# Patient Record
Sex: FEMALE | Race: WHITE | NOT HISPANIC OR LATINO | Employment: UNEMPLOYED | URBAN - METROPOLITAN AREA
[De-identification: names, ages, dates, MRNs, and addresses within clinical notes are randomized per-mention and may not be internally consistent; named-entity substitution may affect disease eponyms.]

---

## 2018-11-13 ENCOUNTER — OFFICE VISIT (OUTPATIENT)
Dept: URGENT CARE | Facility: CLINIC | Age: 33
End: 2018-11-13
Payer: COMMERCIAL

## 2018-11-13 VITALS
TEMPERATURE: 98.6 F | HEIGHT: 65 IN | SYSTOLIC BLOOD PRESSURE: 128 MMHG | HEART RATE: 110 BPM | RESPIRATION RATE: 16 BRPM | OXYGEN SATURATION: 99 % | DIASTOLIC BLOOD PRESSURE: 80 MMHG | WEIGHT: 204 LBS | BODY MASS INDEX: 33.99 KG/M2

## 2018-11-13 DIAGNOSIS — L50.8 URTICARIA, ACUTE: Primary | ICD-10-CM

## 2018-11-13 PROCEDURE — G0382 LEV 3 HOSP TYPE B ED VISIT: HCPCS | Performed by: FAMILY MEDICINE

## 2018-11-13 PROCEDURE — 99283 EMERGENCY DEPT VISIT LOW MDM: CPT | Performed by: FAMILY MEDICINE

## 2018-11-13 RX ORDER — METHYLPREDNISOLONE 4 MG/1
TABLET ORAL
Qty: 21 TABLET | Refills: 0 | Status: SHIPPED | OUTPATIENT
Start: 2018-11-13

## 2018-11-13 NOTE — PATIENT INSTRUCTIONS
This allergic reaction appears to be systemic  It is unclear what you may be reacting to  Make a list of everything you ingested yesterday, and, if this recurs in the future, make another list and see if there is anything in common to both lists  If you are unable to determine what is causing the reaction you may have follow up with an allergist for further testing

## 2018-11-13 NOTE — PROGRESS NOTES
Assessment/Plan:      Diagnoses and all orders for this visit:    Urticaria, acute  -     Methylprednisolone 4 MG TBPK; Use as directed on package          Subjective:     Patient ID: April Jose Yeung is a 28 y o  female  Patient is generally not to prone to allergies  This morning, after putting on a pair of socks, she started with increased itching all over her body  She had not ingested anything at that point  She presents now with urticaria which is pretty generalized  She did take some Benadryl which has helped with the itch  Review of Systems   Constitutional: Negative  HENT: Negative  Eyes: Negative  Respiratory: Negative  Skin: Positive for rash  See HPI         Objective:     Physical Exam   Constitutional: She is oriented to person, place, and time  She appears well-developed and well-nourished  HENT:   Head: Normocephalic and atraumatic  Eyes: Conjunctivae are normal    Cardiovascular: Normal rate and regular rhythm  Pulmonary/Chest: Effort normal and breath sounds normal    Neurological: She is alert and oriented to person, place, and time  Skin:   Per is patient has fairly extensive a urticaria involving most of her skin  Psychiatric: She has a normal mood and affect

## 2019-04-24 ENCOUNTER — TRANSCRIBE ORDERS (OUTPATIENT)
Dept: ADMINISTRATIVE | Facility: HOSPITAL | Age: 34
End: 2019-04-24

## 2019-04-24 DIAGNOSIS — E03.9 PRIMARY HYPOTHYROIDISM: Primary | ICD-10-CM

## 2019-05-02 ENCOUNTER — HOSPITAL ENCOUNTER (OUTPATIENT)
Dept: ULTRASOUND IMAGING | Facility: CLINIC | Age: 34
Discharge: HOME | End: 2019-05-02
Payer: COMMERCIAL

## 2019-05-02 DIAGNOSIS — E03.9 PRIMARY HYPOTHYROIDISM: ICD-10-CM

## 2019-05-02 PROCEDURE — 76536 US EXAM OF HEAD AND NECK: CPT

## 2019-09-04 ENCOUNTER — OFFICE VISIT (OUTPATIENT)
Dept: URGENT CARE | Facility: CLINIC | Age: 34
End: 2019-09-04
Payer: COMMERCIAL

## 2019-09-04 VITALS
DIASTOLIC BLOOD PRESSURE: 62 MMHG | OXYGEN SATURATION: 97 % | HEIGHT: 65 IN | HEART RATE: 102 BPM | SYSTOLIC BLOOD PRESSURE: 114 MMHG | RESPIRATION RATE: 16 BRPM | WEIGHT: 188 LBS | TEMPERATURE: 98.8 F | BODY MASS INDEX: 31.32 KG/M2

## 2019-09-04 DIAGNOSIS — J45.909 ASTHMATIC BRONCHITIS WITHOUT COMPLICATION, UNSPECIFIED ASTHMA SEVERITY, UNSPECIFIED WHETHER PERSISTENT: Primary | ICD-10-CM

## 2019-09-04 PROCEDURE — 99283 EMERGENCY DEPT VISIT LOW MDM: CPT | Performed by: EMERGENCY MEDICINE

## 2019-09-04 PROCEDURE — G0382 LEV 3 HOSP TYPE B ED VISIT: HCPCS | Performed by: EMERGENCY MEDICINE

## 2019-09-04 RX ORDER — LEVOTHYROXINE SODIUM 0.05 MG/1
50 TABLET ORAL DAILY
Refills: 3 | COMMUNITY
Start: 2019-08-24

## 2019-09-04 RX ORDER — PREDNISONE 20 MG/1
40 TABLET ORAL DAILY
Qty: 10 TABLET | Refills: 0 | Status: SHIPPED | OUTPATIENT
Start: 2019-09-04 | End: 2019-09-09

## 2019-09-04 RX ORDER — BENZONATATE 100 MG/1
100 CAPSULE ORAL 3 TIMES DAILY PRN
Qty: 20 CAPSULE | Refills: 0 | Status: SHIPPED | OUTPATIENT
Start: 2019-09-04

## 2019-09-04 RX ORDER — AZITHROMYCIN 250 MG/1
TABLET, FILM COATED ORAL
Qty: 6 TABLET | Refills: 0 | Status: SHIPPED | OUTPATIENT
Start: 2019-09-04 | End: 2019-09-08

## 2019-09-04 NOTE — PROGRESS NOTES
Assessment/Plan:    No problem-specific Assessment & Plan notes found for this encounter  Diagnoses and all orders for this visit:    Asthmatic bronchitis without complication, unspecified asthma severity, unspecified whether persistent  -     predniSONE 20 mg tablet; Take 2 tablets (40 mg total) by mouth daily for 5 days  -     benzonatate (TESSALON PERLES) 100 mg capsule; Take 1 capsule (100 mg total) by mouth 3 (three) times a day as needed for cough  -     azithromycin (ZITHROMAX) 250 mg tablet; Take 2 tablets today then 1 tablet daily x 4 days    Other orders  -     levothyroxine 50 mcg tablet; Take 50 mcg by mouth daily          Subjective:      Patient ID: Marilia Boone is a 27 y o  female  Developed laryngitis 2 5 weeks ago, which has resolved but developed a cough with wheezing since    Cough   This is a new problem  The current episode started 1 to 4 weeks ago  The problem has been gradually worsening  The problem occurs every few minutes  The cough is non-productive  Associated symptoms include wheezing  Nothing aggravates the symptoms  She has tried nothing for the symptoms  The treatment provided no relief  The following portions of the patient's history were reviewed and updated as appropriate: allergies, current medications, past family history, past medical history, past social history, past surgical history and problem list     Review of Systems   Respiratory: Positive for cough and wheezing  All other systems reviewed and are negative  Objective:      /62   Pulse 102   Temp 98 8 °F (37 1 °C)   Resp 16   Ht 5' 5" (1 651 m)   Wt 85 3 kg (188 lb)   SpO2 97%   BMI 31 28 kg/m²          Physical Exam   Constitutional: She is oriented to person, place, and time  She appears well-developed and well-nourished  HENT:   Nose: Nose normal    Eyes: Pupils are equal, round, and reactive to light  Neck: Normal range of motion  Cardiovascular: Normal rate  Pulmonary/Chest: Effort normal  She has wheezes  Abdominal: Soft  Neurological: She is alert and oriented to person, place, and time  Skin: Skin is warm and dry  Psychiatric: She has a normal mood and affect  Her behavior is normal  Judgment and thought content normal    Nursing note and vitals reviewed

## 2022-09-28 ENCOUNTER — OFFICE VISIT (OUTPATIENT)
Dept: OBGYN CLINIC | Facility: MEDICAL CENTER | Age: 37
End: 2022-09-28
Payer: MEDICARE

## 2022-09-28 VITALS
SYSTOLIC BLOOD PRESSURE: 146 MMHG | HEIGHT: 65 IN | WEIGHT: 226 LBS | BODY MASS INDEX: 37.65 KG/M2 | HEART RATE: 96 BPM | DIASTOLIC BLOOD PRESSURE: 96 MMHG

## 2022-09-28 DIAGNOSIS — M54.41 ACUTE RIGHT-SIDED LOW BACK PAIN WITH RIGHT-SIDED SCIATICA: Primary | ICD-10-CM

## 2022-09-28 PROCEDURE — 99204 OFFICE O/P NEW MOD 45 MIN: CPT | Performed by: STUDENT IN AN ORGANIZED HEALTH CARE EDUCATION/TRAINING PROGRAM

## 2022-09-28 RX ORDER — MELOXICAM 15 MG/1
15 TABLET ORAL DAILY
Qty: 30 TABLET | Refills: 1 | Status: SHIPPED | OUTPATIENT
Start: 2022-09-28

## 2022-09-28 RX ORDER — GABAPENTIN 300 MG/1
300 CAPSULE ORAL 3 TIMES DAILY
Qty: 90 CAPSULE | Refills: 1 | Status: SHIPPED | OUTPATIENT
Start: 2022-09-28

## 2022-09-28 NOTE — PROGRESS NOTES
1  Acute right-sided low back pain with right-sided sciatica  meloxicam (Mobic) 15 mg tablet    gabapentin (Neurontin) 300 mg capsule    Nerve Stimulator (TENS Therapy Pain Relief) [address]    Ambulatory Referral to [facility]   2  BMI 37 0-37 9, adult  meloxicam (Mobic) 15 mg tablet    gabapentin (Neurontin) 300 mg capsule    Nerve Stimulator (TENS Therapy Pain Relief) [address]    Ambulatory Referral to [facility]     Orders Placed This Encounter   Procedures    Ambulatory Referral to [facility]        Imaging Studies (I personally reviewed images in PACS and report):  Deferred imaging today    IMPRESSION:   Acute right low back pain with reported radiculopathy of right lower extremity - +SLR on right   Atraumatic for the past 2 weeks    Other factors:   BMI 37    PLAN:     Clinical exam and radiographic imaging reviewed with patient today, with impression as per above  I have discussed with the patient the pathophysiology of this diagnosis and reviewed how the examination correlates with this diagnosis   Recommended treating conservatively at this time  I deferred imaging of her low back given she does not have any red flags for immediate imaging at this time  Patient agreeable as well   Regards to pain control, I prescribed her meloxicam 15 mg p o  Q day  I counseled not to concurrently take NSAIDs with meloxicam but could take acetaminophen 500-1000 mg p o  Q 6-8 hours p r n   I have also prescribed her gabapentin 300 mg p o  Q h s  to start with but counseled she can taper up to b i d  And eventually t i d  As needed for pain every 3-4 days  I discussed the side effect of sedation while on gabapentin and that she should not operate heavy machinery if this were to occur  I also recommended p r n  Use of heat/ice therapy 20 minutes on/off   I have also prescribed her a TENs machine unit that she can use 3 times a day for 15 minutes sessions at a time     BMI Counseling: Body mass index is 37 61 kg/m²  The BMI is above normal  Patient referred to PCP due to patient being obese  Return in about 3 weeks (around 10/19/2022)  Portions of the record may have been created with voice recognition software  Occasional wrong word or "sound a like" substitutions may have occurred due to the inherent limitations of voice recognition software  Read the chart carefully and recognize, using context, where substitutions have occurred  CHIEF COMPLAINT:  Chief Complaint   Patient presents with    Spine - Pain         HPI:  Marilia Yeung is a 44 y o  female  who presents for       Visit 9/28/2022 :  Initial evaluation of low back pain:  Patient did see urgent care on 09/20/2022 (note reviewed) - Medrol dose pack prescribed  She reports this been ongoing issue for the past 2 weeks without a precipitating injury and states that she woke up with right lower back pain radiating down her right leg into her foot  She states there is concurrent numbness and tingling  She reports that while on the Medrol Dosepak, she did state this severe pain became more moderate intensity  However now that she recently completed it, she feels the pain slowly returning again  Most of her pain is in the midline and right paralumbar aspect of her back and radiates down to her right foot  She also reports tingling/numbness of her right lower extremity intermittently  She describes the pain as shooting/throbbing/burning  She states is aggravated with any range of motion movements of her back as well as lifting/pushing/pulling  She states an overall tightness over low back in general   She denies any bowel/bladder incontinence  She states that she has had similar low back pain in the past but nothing this severe  She denies prior injuries or surgeries of her low back in the past   She states this pain is interfering with her activities of daily living as well as her ability to sleep at night  She reports attempting to do home stretches but feels too much difficulty on her own to tolerate  Medical, Surgical, Family, and Social History    Past Medical History:   Diagnosis Date    Hypothyroid      History reviewed  No pertinent surgical history  Social History   Social History     Substance and Sexual Activity   Alcohol Use No     Social History     Substance and Sexual Activity   Drug Use No     Social History     Tobacco Use   Smoking Status Never Smoker   Smokeless Tobacco Never Used     History reviewed  No pertinent family history  No Known Allergies       Physical Exam  /96   Pulse 96   Ht 5' 5" (1 651 m)   Wt 103 kg (226 lb)   BMI 37 61 kg/m²     Constitutional:  see vital signs  Gen: obese, normocephalic/atraumatic, well-groomed  Eyes: No inflammation or discharge of conjunctiva or lids; sclera clear   Pharynx: no inflammation, lesion, or mass of lips  Neck: supple, no masses, non-distended  MSK: no inflammation, lesion, mass, or clubbing of nails and digits except for other than mentioned below  SKIN: no visible rashes or skin lesions  Pulmonary/Chest: Effort normal  No respiratory distress         Ortho Exam  BACK EXAM:  Gait: normal, no trendelenberg gait, no antalgic gait    BACK TENDERNESS:  Spinous Processes: +L5  Paraspinal Muscles: +b/l paralumbar  SI Joint: no  Sacrum: no    ROM: (all motions aggravates lumbar)  Flexion: 60  Extension: 5  Lateral flexion: 20 b/l  Rotation: intact: 30 b/l    DERMATOMAL SENSATION:  L1: normal   L2: normal   L3: normal   L4: decreased on right compared to left  L5: decreased on right compared to left  S1: normal    STRENGTH (bilateral):  Knee Extension: 5/5  Knee Flexion: 4/5 on right, 5/5 on left  Foot Dorsiflexion: 5/5  Great Toe Extension: 4/5 on right, 5/5 on left  Foot Plantarflexion: 4/5 on right, 5/5 on left  Hip Flexion: 4/5 on right, 5/5 on left      REFLEXES:  Patellar: 2+ bilateral  Achilles: 2+ bilateral  Clonus: negative bilateral    BACK:   SUPINE STRAIGHT LEG: +on right  SLUMP (seated straight leg trunk flexed): +on right    HIP:  LOG ROLL: negative  CELESTE: negative  FADIR: negative    Procedures

## 2022-10-05 ENCOUNTER — EVALUATION (OUTPATIENT)
Dept: PHYSICAL THERAPY | Facility: CLINIC | Age: 37
End: 2022-10-05
Payer: MEDICARE

## 2022-10-05 DIAGNOSIS — M54.41 ACUTE RIGHT-SIDED LOW BACK PAIN WITH RIGHT-SIDED SCIATICA: Primary | ICD-10-CM

## 2022-10-05 PROCEDURE — 97112 NEUROMUSCULAR REEDUCATION: CPT | Performed by: PHYSICAL THERAPIST

## 2022-10-05 PROCEDURE — 97162 PT EVAL MOD COMPLEX 30 MIN: CPT | Performed by: PHYSICAL THERAPIST

## 2022-10-05 NOTE — PROGRESS NOTES
PT Evaluation     Today's date: 10/5/2022  Patient name: Rockne Bosworth  : 1985  MRN: [id number]  Referring provider: Debbie Johnson MD  Dx:   Encounter Diagnosis     ICD-10-CM    1  Acute right-sided low back pain with right-sided sciatica  M54 41 Ambulatory Referral to [facility]                  Assessment  Assessment details: April Carlos Bullock is a 44 y o  female who presents with pain, decreased strength, decreased ROM, decreased joint mobility, decreased sensation, ambulatory dysfunction and postural dysfunction  Due to these impairments, patient has difficulty performing a/iadls, recreational activities, work-related activities and engaging in social activities  Patient's clinical presentation is consistent with their referring diagnosis of Acute right-sided low back pain with right-sided sciatica , and presents with symptoms that improved with extension based exercises  Will progress on this path initially  Patient has been educated in home exercise program and plan of care  Patient would benefit from skilled physical therapy services to address their aforementioned functional limitations and progress towards prior level of function and independence with home exercise program    Impairments: abnormal gait, abnormal muscle firing, abnormal or restricted ROM, abnormal movement, activity intolerance, impaired physical strength, lacks appropriate home exercise program, pain with function, weight-bearing intolerance, poor posture  and poor body mechanics    Symptom irritability: highUnderstanding of Dx/Px/POC: good   Prognosis: good    Goals  Short Term Goals: Target Date 4 weeks  1  Pt will initiate and advance HEP  2  Pt will have < 3/10 pain  3  Pt will have full arom of the l/s  4  Pt will be able to sit to stand with out difficulty    Long Term Goals: Target Date 8 weeks  1  Pt will demonstrate independence in HEP  2  Pt will have <1/10 pain  3   Pt will have full core and B LE strength  4  Pt will be able to stand for 30 min with out difficulty      Plan  Patient would benefit from: skilled PT  Planned modality interventions: cryotherapy, thermotherapy: hydrocollator packs and TENS  Planned therapy interventions: joint mobilization, manual therapy, patient education, postural training, activity modification, abdominal trunk stabilization, body mechanics training, flexibility, functional ROM exercises, graded exercise, home exercise program, neuromuscular re-education, strengthening, stretching, therapeutic activities, therapeutic exercise, motor coordination training, muscle pump exercises, balance/weight bearing training, ADL training and breathing training  Frequency: 2x week  Duration in weeks: 8  Plan of Care beginning date: 10/5/2022  Plan of Care expiration date: 11/30/2022  Treatment plan discussed with: patient        Subjective Evaluation    History of Present Illness  Mechanism of injury: Patient did see urgent care on 09/20/2022, and orthopedics (notes reviewed) - Medrol dose pack prescribed by care now which helped for first day only  She reports this been ongoing issue for the past 3 weeks without any injury and states that she woke up with right lower back pain radiating down her right leg into her foot  She states there is concurrent numbness and tingling in the right foot  She also notes that since the onset she also saw chiro who has helped and allowed her to stand upright again  Most of her pain is in the midline and right paralumbar aspect of her back and radiates down to her right foot  She also reports tingling/numbness of her right foot fairly regularly  She describes the pain as shooting/throbbing/burning  She states is aggravated with any WB activity, and she also is unable to carry anything  She states an overall tightness over low back in general   She denies any bowel/bladder incontinence    She states that she has had similar low back pain in the past but nothing this severe, and not radiating down the leg  She denies prior injuries or surgeries of her low back in the past   She states this pain is interfering with her activities of daily living as well as her ability to sleep at night  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  She reports attempting to do home stretches but feels like they aggravated things  She has been sleeping on her recliner with inability to sleep in bed  Has trouble standing more than 10 days  She also notes that the gabapentin has been very helpful with her pain levels as well  Pain  Current pain rating: 3  At best pain rating: 3  At worst pain rating: 10  Location: l/s and right leg  Quality: dull ache, discomfort, radiating, throbbing and sharp  Aggravating factors: sitting  Progression: improved    Patient Goals  Patient goals for therapy: decreased pain, increased motion, independence with ADLs/IADLs, increased strength, return to sport/leisure activities, return to work and improved balance          Objective     Concurrent Complaints  Positive for disturbed sleep  Negative for night pain, bladder dysfunction, bowel dysfunction, saddle (S4) numbness, history of cancer, history of trauma and infection    Static Posture   General Observations  Asymmetrical weight bearing, shifted left, flexed and guarded  Thoracic Spine  Hyperkyphosis  Lumbar Spine   Increased lordosis  Palpation   Left   Tenderness of the quadratus lumborum  Right   Tenderness of the gluteus ernst, gluteus medius, piriformis and quadratus lumborum  Tenderness     Right Hip   Tenderness in the PSIS       Neurological Testing     Sensation     Hip   Left Hip   Intact: light touch    Right Hip   Diminished: light touch    Reflexes   Left   Patellar (L4): normal (2+)  Achilles (S1): normal (2+)    Right   Patellar (L4): normal (2+)  Achilles (S1): normal (2+)    Active Range of Motion     Lumbar   Flexion:  with pain Restriction level: moderate  Extension:  Restriction level: minimal  Left lateral flexion:  Restriction level: moderate  Right lateral flexion:  with pain Restriction level: moderate  Mechanical Assessment    Cervical      Thoracic      Lumbar    Lying extension: repeated movements  Pain location: centralized  Pain intensity: better  Pain level: decreased    Strength/Myotome Testing     Left Hip   Planes of Motion   Flexion: 4    Right Hip   Planes of Motion   Flexion: 3+    Left Knee   Flexion: 4  Extension: 4    Right Knee   Flexion: 3+  Extension: 3-    Left Ankle/Foot   Dorsiflexion: 4+  Great toe extension: 4    Right Ankle/Foot   Dorsiflexion: 3+ (improved with PPU)  Great toe extension: 3+ (improved to 4- with ppu)    Muscle Activation   Patient able to activate left transverse abdominals, left external obliques, left internal obliques, left multifidus, right transverse abdominals, right external obliques, right internal obliques and right multifidus  Tests     Lumbar   Positive prone instability   Right   Positive slump test      Ambulation     Observational Gait   Gait: asymmetric   Increased left stance time and right swing time  Decreased walking speed, stride length, right stance time, left swing time, left step length and right step length     Left foot contact pattern: heel to toe  Right foot contact pattern: foot flat             Precautions: hashimoto's disease      Manuals 10/5                                                                Neuro Re-Ed                                                                                                        Ther Ex                                                                                                                     Ther Activity                                       Gait Training                                       Modalities             HEP DB

## 2022-10-10 ENCOUNTER — OFFICE VISIT (OUTPATIENT)
Dept: PHYSICAL THERAPY | Facility: CLINIC | Age: 37
End: 2022-10-10
Payer: MEDICARE

## 2022-10-10 DIAGNOSIS — M54.41 ACUTE RIGHT-SIDED LOW BACK PAIN WITH RIGHT-SIDED SCIATICA: Primary | ICD-10-CM

## 2022-10-10 PROCEDURE — 97140 MANUAL THERAPY 1/> REGIONS: CPT | Performed by: PHYSICAL THERAPIST

## 2022-10-10 PROCEDURE — 97112 NEUROMUSCULAR REEDUCATION: CPT | Performed by: PHYSICAL THERAPIST

## 2022-10-10 NOTE — PROGRESS NOTES
Daily Note     Today's date: 10/10/2022  Patient name: Jackie Flores  : 1985  MRN: [id number]  Referring provider: Anh Dias MD  Dx:   Encounter Diagnosis     ICD-10-CM    1  Acute right-sided low back pain with right-sided sciatica  M54 41                   Subjective:  Pt notes that she has been doing her exercises consistently  She also notes that she feels as if her walking is better, and less fuzziness in her toes  Objective: See treatment diary below      Assessment: pt did have pain today in prone with left knee flexion provoking right post thigh pain  This was reduced with l/s pa mobs l/s stm and PPU  Progress is being made with exercises  Did increase the core stabilization program today for clinic and HEP  Spoke to T[employer] rep today, will have a donated unit for her next visit  Plan: Continue per plan of care        Precautions: hashimoto's disease      Manuals 10/5 10/10           Right gluteal stm  Db           L/s pa and rot mobs in prone and s/l resp  Grade 2-3 Db           L/s psm stm  DB                        Neuro Re-Ed             ppt  5''x2x10           Ppt bkfo  5''x2x10           Ppt march  5''x2x10           Ppt bridge  2x5                                                  Ther Ex             Prone knee flex stretch  DB                                                                                                      Ther Activity                                       Gait Training                                       Modalities             HEP DB

## 2022-10-13 ENCOUNTER — APPOINTMENT (OUTPATIENT)
Dept: PHYSICAL THERAPY | Facility: CLINIC | Age: 37
End: 2022-10-13

## 2022-10-18 ENCOUNTER — OFFICE VISIT (OUTPATIENT)
Dept: PHYSICAL THERAPY | Facility: CLINIC | Age: 37
End: 2022-10-18
Payer: MEDICARE

## 2022-10-18 DIAGNOSIS — M54.41 ACUTE RIGHT-SIDED LOW BACK PAIN WITH RIGHT-SIDED SCIATICA: Primary | ICD-10-CM

## 2022-10-18 PROCEDURE — 97112 NEUROMUSCULAR REEDUCATION: CPT

## 2022-10-18 PROCEDURE — 97140 MANUAL THERAPY 1/> REGIONS: CPT

## 2022-10-18 NOTE — PROGRESS NOTES
Daily Note     Today's date: 10/18/2022  Patient name: Charlene Dao  : 1985  MRN: [id number]  Referring provider: Lily Peralta MD  Dx:   Encounter Diagnosis     ICD-10-CM    1  Acute right-sided low back pain with right-sided sciatica  M54 41        Start Time: 0950  Stop Time: 1033  Total time in clinic (min): 43 minutes    Subjective: Pt reports she has started to see some improvements in symptoms since starting therapy  Notes radicular symptoms have been improving and less intense  Has reduced taking nerve block medication from twice a day to once a day  Objective: See treatment diary below  Educated on use of donated TENS unit and provided for use at home  Pt acknowledged understanding of all that was discussed  Assessment: Tolerated treatment well  Continued with program as outlined below, with focus on core stability and mobility of L/S   R L/S mobility did begin to improve with manual mobilizations performed by therapist   Most challenged with PPT bridges, but was able to progress with increased reps today  Patient would benefit from continued PT to further improve strength, decrease intensity/frequency of symptoms, and maximize overall function  Plan: Continue per plan of care        Precautions: hashimoto's disease      Manuals 10/5 10/10 10/18          Right gluteal stm  Db AFB          L/s pa and rot mobs in prone and s/l   Grade 2-3 Db Grade 2-3 Db          L/s psm stm  DB AFB                       Neuro Re-Ed             ppt  5''x2x10 5''x2x10          Ppt bkfo  5''x2x10 5''x2x10          Ppt march  5''x2x10 5''x2x10          Ppt bridge  2x5 3x5                                                 Ther Ex             Prone knee flex stretch  DB AFB                                                                                                     Ther Activity                                       Gait Training                                       Modalities HEP DB

## 2022-10-21 ENCOUNTER — OFFICE VISIT (OUTPATIENT)
Dept: PHYSICAL THERAPY | Facility: CLINIC | Age: 37
End: 2022-10-21
Payer: MEDICARE

## 2022-10-21 DIAGNOSIS — M54.41 ACUTE RIGHT-SIDED LOW BACK PAIN WITH RIGHT-SIDED SCIATICA: Primary | ICD-10-CM

## 2022-10-21 PROCEDURE — 97112 NEUROMUSCULAR REEDUCATION: CPT

## 2022-10-21 PROCEDURE — 97140 MANUAL THERAPY 1/> REGIONS: CPT

## 2022-10-21 PROCEDURE — 97110 THERAPEUTIC EXERCISES: CPT

## 2022-10-21 NOTE — PROGRESS NOTES
Daily Note     Today's date: 10/21/2022  Patient name: Russ Briceno  : 1985  MRN: [id number]  Referring provider: Lauri Silver MD  Dx:   Encounter Diagnosis     ICD-10-CM    1  Acute right-sided low back pain with right-sided sciatica  M54 41                   Subjective: pt notes that overall she is getting better, her pain in leg is still constant but much less and no more tingling in toes  Objective: See treatment diary below      Assessment: Tolerated treatment with additional exercises with no increased sxs except with trial of QL stretch toward right started with very mild sxs in hip,held this direction for now    Patient exhibited good technique with therapeutic exercises and would benefit from continued PT      Plan: Continue per plan of care        Precautions: hashimoto's disease      Manuals 10/5 10/10 10/18 10/21         Right gluteal stm  Db AF[employer] DL         L/s pa and rot mobs in prone and s/l   Grade 2-3 Db Grade 2-3 Db DB in prone         L/s psm stm  DB AF[employer] DL                      Neuro Re-Ed             ppt  5''x2x10 5''x2x10 5"x20         Ppt bkfo  5''x2x10 5''x2x10 5"x20         Ppt march  5''x2x10 5''x2x10 5"x20         Ppt bridge  2x5 3x5 2x10         Ppt hip add squeeze    5"x10         Ppt w/ hip abd    5"x10                      Ther Ex             Prone knee flex stretch  DB AFB DL         Bike     lv 0 5'         QL stretch     5'x5 for R side                                                                          Ther Activity                                       Gait Training                                       Modalities             HEP DB

## 2022-10-25 ENCOUNTER — OFFICE VISIT (OUTPATIENT)
Dept: PHYSICAL THERAPY | Facility: CLINIC | Age: 37
End: 2022-10-25
Payer: MEDICARE

## 2022-10-25 DIAGNOSIS — M54.41 ACUTE RIGHT-SIDED LOW BACK PAIN WITH RIGHT-SIDED SCIATICA: Primary | ICD-10-CM

## 2022-10-25 PROCEDURE — 97140 MANUAL THERAPY 1/> REGIONS: CPT | Performed by: PHYSICAL THERAPIST

## 2022-10-25 PROCEDURE — 97112 NEUROMUSCULAR REEDUCATION: CPT | Performed by: PHYSICAL THERAPIST

## 2022-10-25 PROCEDURE — 97110 THERAPEUTIC EXERCISES: CPT | Performed by: PHYSICAL THERAPIST

## 2022-10-25 NOTE — PROGRESS NOTES
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, as well as decreased gabapentin in the am        Objective: See treatment diary below      Assessment: Pt with improved tolerance to movement, but still with weakness in the right LE  Pt did well with progression of exercises today will progress to pallof and chops nv    Plan: Continue per plan of care        Precautions: hashimoto's disease      Manuals 10/5 10/10 10/18 10/21 10/25        Right gluteal stm  Db AFB DL DB        L/s pa and rot mobs in prone and s/l   Grade 2-3 Db Grade 2-3 Db DB in prone In s/l 3-4        L/s psm stm  DB AFB DL DB                     Neuro Re-Ed             ppt  5''x2x10 5''x2x10 5"x20 HEP        Ppt bkfo  5''x2x10 5''x2x10 5"x20 2# 2x10        Ppt march  5''x2x10 5''x2x10 5"x20 SlR x10 ea        Ppt bridge  2x5 3x5 2x10 2x10        Ppt hip add squeeze    5"x10 5''x20        Ppt w/ hip abd    5"x10 gtb 5'x2'x10        Prone hip ext alt w/ ppt     2x10        Ther Ex             Prone knee flex stretch  DB AFB DL nv        Bike     lv 0 5' lv 1 5'        QL stretch     5'x5 for R side 5''x10 R side        [place] and arrow     5''x10                                                            Ther Activity                                       Gait Training                                       Modalities             HEP DB

## 2022-11-01 ENCOUNTER — TELEPHONE (OUTPATIENT)
Dept: OBGYN CLINIC | Facility: CLINIC | Age: 37
End: 2022-11-01

## 2022-11-01 NOTE — TELEPHONE ENCOUNTER
Called & LVM for patient asking her to call back [phone]  I spoke to Dr Franco Hathaway & has specific instructions depending on how many times a day patient is currently taking Gabapentin  I will await call back to instruct on weaning dose per Ricardo

## 2022-11-01 NOTE — TELEPHONE ENCOUNTER
Caller: April    Doctor: Jordan Mcgill    Reason for call: returning a call    Unable to transfer     Call back#: [phone]

## 2022-11-01 NOTE — TELEPHONE ENCOUNTER
Caller: Patient    Doctor: Shorty Leon     Reason for call: Patient states she has been doing PT and is feeling very well  She is wanting to know if she should be taken off the gabapentin 300 mg and if so how can she be weaned off?      Call back#: [phone]

## 2022-11-01 NOTE — TELEPHONE ENCOUNTER
Called & spoke to patient  Currently taking once every night  I let her know, per Dr Millan [place], she can continue to take this regimen for 3 more nights and then cease  Patient verbalized understanding

## 2022-11-17 ENCOUNTER — OFFICE VISIT (OUTPATIENT)
Dept: ENDOCRINOLOGY | Facility: HOSPITAL | Age: 37
End: 2022-11-17

## 2022-11-17 VITALS
BODY MASS INDEX: 38.75 KG/M2 | HEIGHT: 65 IN | SYSTOLIC BLOOD PRESSURE: 122 MMHG | WEIGHT: 232.6 LBS | DIASTOLIC BLOOD PRESSURE: 78 MMHG | HEART RATE: 96 BPM

## 2022-11-17 DIAGNOSIS — R63.5 WEIGHT GAIN: ICD-10-CM

## 2022-11-17 DIAGNOSIS — E55.9 VITAMIN D DEFICIENCY: ICD-10-CM

## 2022-11-17 DIAGNOSIS — E03.8 HYPOTHYROIDISM DUE TO HASHIMOTO'S THYROIDITIS: Primary | ICD-10-CM

## 2022-11-17 DIAGNOSIS — E06.3 HYPOTHYROIDISM DUE TO HASHIMOTO'S THYROIDITIS: Primary | ICD-10-CM

## 2022-11-17 RX ORDER — LEVOTHYROXINE SODIUM 88 UG/1
88 TABLET ORAL DAILY
COMMUNITY
Start: 2022-09-28

## 2022-11-17 NOTE — PROGRESS NOTES
11/19/2022    Assessment/Plan      Diagnoses and all orders for this visit:    Hypothyroidism due to Hashimoto's thyroiditis  -     Comprehensive metabolic panel Lab Collect  -     Insulin-like growth factor 1 (IGF-1) Lab Collect  -     Prolactin Lab Collect  -     Testosterone, free, total Lab Collect  -     Vitamin D 25 hydroxy Lab Collect  -     Insulin, fasting- Lab Collect    Weight gain  -     Comprehensive metabolic panel Lab Collect  -     Insulin-like growth factor 1 (IGF-1) Lab Collect  -     Prolactin Lab Collect  -     Testosterone, free, total Lab Collect  -     Vitamin D 25 hydroxy Lab Collect  -     Insulin, fasting- Lab Collect  -     Creatinine, urine, 24 hour- Lab Collect; Future  -     Cortisol, Free, Urine, 24 Hour; Future    Vitamin D deficiency  -     Comprehensive metabolic panel Lab Collect  -     Insulin-like growth factor 1 (IGF-1) Lab Collect  -     Prolactin Lab Collect  -     Testosterone, free, total Lab Collect  -     Vitamin D 25 hydroxy Lab Collect  -     Insulin, fasting- Lab Collect    Other orders  -     levothyroxine 88 mcg tablet; Take 88 mcg by mouth daily  -     COD LIVER OIL PO; Take 3,000 mg by mouth in the morning  -     Ascorbic Acid (VITAMIN C PO); Take by mouth  -     Probiotic Product (PROBIOTIC PO); Take by mouth With vitamin c  -     MISC NATURAL PRODUCTS PO; Take by mouth Desiccated thyroid-[facility] health 130 mg supplement (reportedly T3) daily  -     MISC NATURAL PRODUCTS PO; Take by mouth Molecular progesterone complex from [facility] health daily day 14-28 of her cycle  -     IODINE, KELP, PO; Take by mouth Standard process prolamine iodine 3000 mg daily  -     MISC NATURAL PRODUCTS PO; Take by mouth Liver supplement by standard process, Betafood 312 mg daily        Assessment/Plan:  1  Hypothyroidism due to Hashimoto's thyroiditis  She is on levothyroxine and some sort of supplement she got on the Internet that is supposed to be a T3 supplementation    I have educated her that this is not approved by the FDA and there is no clue as to exactly what is in this supplement and that it may not have the same amount in each pill that she takes  She is also taking some sort of iodine supplement and it is unclear exactly how much  I educated her that high doses of iodine in a patient with Hashimoto's can actually worsen there hypothyroidism due to its effects on the enzyme needed to make thyroid hormone and also it is a problem in patients with hyperthyroidism due to Graves disease as it is like adding fuel to the fire  She is going to get me a photo of all her supplements so that I have a better idea of what she is taking  I did discuss with her that I do not utilize supplements to add T3 when I want to add T3 and a patient, there is a prescription brand T3 supplementation called Cytomel/liothyronine and I usually add that so I have a better idea of exactly what patients are taking and can adjust accordingly  2  Vitamin-D deficiency  She reportedly has a history of vitamin-D deficiency and is on no specific vitamin-D replacement  I will check a vitamin-D level  3  Weight gain  I will evaluate for secondary causes of weight gain including insulin resistance, pituitary abnormalities given her history hemorrhage after a pregnancy and lack of breast milk production with her last pregnancy, and Cushing's disease  She will be getting a fasting CMP with insulin level, IGF-1 level, prolactin, free and total testosterone level, and 25 hydroxy vitamin-D level  She will also do a 24 hour urine for urinary free cortisol and creatinine  Follow-up will be determined based on the blood work and exactly what supplements she is taking so that we can make adjustments  60 minutes was spent with the patient and over 50% was spent in education regarding hypothyroidism and complications including the use of iodine and thyroid supplements        CC:  Thyroid consult    History of Present Illness     HPI: Marilia Cantrell is a 44y o  year old female with history of hypothyroidism due to Hashimoto's thyroiditis for evaluation/consult  She reports that she had struggles with weight and symptoms of hypothyroidism since 2010  She was 140 lb at that time and is now 230 lb  She was exercising and running a lot and still was gaining weight and she had seen a primary care physician who brushed off her symptoms and checked a TSH which was always okay but symptoms seem to worsen  She had 5 pregnancies with her last pregnancy, she was unable to produce breast milk at the age of 31 when she was able to nurse all other children  She did have lots of bleeding and hemorrhaging after that delivery but she did not need any blood transfusions and this was 7 years ago  She has not tried to get pregnant since and had no other complications with prior pregnancies  She reports that she was diagnosed with hypothyroidism due to Hashimoto's thyroiditis in 2010 when she saw a new primary care physician  Thyroid hormone was started in the dose was adjusted over the years since she is now on levothyroxine 88 mcg daily which was increased in 2021  She still had low energy and felt all over pain and saw her holistic clinic doctor who recommended starting desiccated T3-3 months ago which she was told to just get on the Internet over-the-counter and started taking that  It is unclear exactly how much T3 she is taking as this is a supplement  She reports she has continued to gain weight but did have to stop running as her ankles have become very painful and she can not exercise  She has poor sleep and nausea with food  She does not have any family history of thyroid disease or autoimmune disease  She has heat intolerance and can feel warm all the time  She denies palpitation or tremors  She never feels rested in the morning and is always fatigued    She has not noticed a change with starting the supplement that is supposedly has desiccated T3  She does though have insomnia but never feels she gets deep quality sleep  She has no diarrhea or constipation, anxiety or depression  She has brittle nails and generalized hair loss but not the male pattern  She has no dry skin  She has gained her weight over the last 12 years slowly about 90 lb despite exercising and eating clean  She has not been able to get the weight off after the 4th and 5th pregnancy weight gains  Menstrual cycles have been irregular her whole life and can occur anywhere from 3-7 weeks  They usually are quite heavy in the 1st several days but only last 3-4 days  She is been utilizing progesterone and now over the last 2 months her cycles have been every 29 days  She denies diplopia  She denies compressive thyroid symptoms or difficulties with swallowing  She is no history of head or neck irradiation in the past   Of note, she does have a family history of type 2 diabetes and has no violaceous striae or hirsutism  Review of Systems   Constitutional: Positive for fatigue and unexpected weight change  Does not feel rested in am and always fatigued  Felt no different with the desiccated thyroid T3  Kiki gain up 90 lbs in 12 years slowly despite exercise and eating clean  Weight gain much more with 4th and 5th pregnancy and difficult to get weight off  Started [employer]  Got some weight off  Eating clean 70% of the time now, extra 35 lbs gained in last 7 years  HENT: Negative for hearing loss, tinnitus and trouble swallowing  No XRT the head or neck in the past    Eyes: Negative for visual disturbance  No diplopia  Respiratory: Positive for shortness of breath  Negative for chest tightness  SOB easier with activity  Cardiovascular: Positive for leg swelling  Negative for chest pain and palpitations  Feet and face can swell especially when exercising     Gastrointestinal: Positive for nausea  Negative for abdominal pain, constipation and diarrhea  Have nausea on awakening and after eating  Endocrine: Positive for heat intolerance  Negative for cold intolerance, polydipsia, polyphagia and polyuria  Can feel warm at times  Nocturia none  Hunger when awakening, has to eat within 1 hour of waking or will have nausea  Genitourinary:        Menses irregular whole life  Can occur every 3-7 weeks  3 days with 2 very heavy and clotting days and then 1 lighter day  Menarche around age 14  Were regular on a monthly basis and then got irregular in her 19's  No infertility  No galactorrhea  Maybe once a year, skip 1 month  Last 2 months, has had a 29 day cycle since using progesterone  Also less heavy  Musculoskeletal: Positive for arthralgias, back pain and myalgias  Joint and muscle aches all over  No alleviating factors  Increased activity will worsen it  Skin: Negative for rash  No dry skin  Has brittle nails  Has hair loss, generalized, not int he male pattern  Hirsutism none  No violaceous striae  Neurological: Positive for weakness  Negative for dizziness, tremors, light-headedness, numbness and headaches  Feels generally weak but no proximal muscle weakness  Feels less strong than in the past     Psychiatric/Behavioral: Positive for sleep disturbance  Negative for dysphoric mood  The patient is not nervous/anxious  Had insomnia off levothyroxine  Now a bit better but feels no deep quality sleep  Historical Information   Past Medical History:   Diagnosis Date   • Gastrointestinal food allergy     mushrooms, severe abdominal pain   • Hypothyroid      History reviewed  No pertinent surgical history    Social History   Social History     Substance and Sexual Activity   Alcohol Use No     Social History     Substance and Sexual Activity   Drug Use No     Social History     Tobacco Use   Smoking Status Never   Smokeless Tobacco Never     Family History:   Family History   Problem Relation Age of Onset   • Hypothyroidism Mother    • Thyroid cancer Mother    • Diabetes type II Father    • Heart disease Father    • Heart attack Father         1st one at age 45   • Hypothyroidism Sister    • Diabetes type II Sister    • Thyroid cancer Sister    • No Known Problems Son    • No Known Problems Son    • No Known Problems Son    • No Known Problems Son    • No Known Problems Daughter        Meds/Allergies   Current Outpatient Medications   Medication Sig Dispense Refill   • Ascorbic Acid (VITAMIN C PO) Take by mouth     • COD LIVER OIL PO Take 3,000 mg by mouth in the morning     • IODINE, KELP, PO Take by mouth Standard process prolamine iodine 3000 mg daily     • levothyroxine 88 mcg tablet Take 88 mcg by mouth daily     • MISC NATURAL PRODUCTS PO Take by mouth Desiccated thyroid-[facility] health 130 mg supplement (reportedly T3) daily     • MISC NATURAL PRODUCTS PO Take by mouth Molecular progesterone complex from [facility] daily day 14-28 of her cycle     • MISC NATURAL PRODUCTS PO Take by mouth Liver supplement by standard process, Betafood 312 mg daily     • Nerve Stimulator (TENS Therapy Pain Relief) BERNARDINO Use 1 Device 3 (three) times a day as needed (Apply up to 15 minutes per session as needed for pain) 1 each 0   • Probiotic Product (PROBIOTIC PO) Take by mouth With vitamin c       No current facility-administered medications for this visit  No Known Allergies    Objective   Vitals: Blood pressure 122/78, pulse 96, height 5' 5" (1 651 m), weight 106 kg (232 lb 9 6 oz)  Invasive Devices     None                 Physical Exam  Vitals reviewed  Constitutional:       Appearance: Normal appearance  She is well-developed and well-nourished  HENT:      Head: Normocephalic and atraumatic        Comments: No moon faces  Eyes:      Extraocular Movements: Extraocular movements intact and EOM normal       Conjunctiva/sclera: Conjunctivae normal       Comments: No lid lag, stare, proptosis, or periorbital edema  Neck:      Thyroid: No thyromegaly  Vascular: No carotid bruit  Comments: Thyroid normal in size  No palpable thyroid nodules  No bruits over the thyroid gland  Has a buffalo hump  Has slight supraclavicular fat pads  Cardiovascular:      Rate and Rhythm: Normal rate and regular rhythm  Pulses: Intact distal pulses  Heart sounds: Normal heart sounds  No murmur heard  Pulmonary:      Effort: Pulmonary effort is normal       Breath sounds: Normal breath sounds  No wheezing  Abdominal:      General: Bowel sounds are normal       Palpations: Abdomen is soft  Tenderness: There is no abdominal tenderness  Musculoskeletal:         General: No deformity or edema  Normal range of motion  Cervical back: Normal range of motion and neck supple  Right lower leg: No edema  Left lower leg: No edema  Comments: No tremor of the outstretched hands  No spinous process tenderness  No CVA tenderness  Lymphadenopathy:      Cervical: No cervical adenopathy  Skin:     General: Skin is warm and dry  Findings: No rash  Comments: No violaceous striae  Neurological:      Mental Status: She is alert and oriented to person, place, and time  Deep Tendon Reflexes: Reflexes are normal and symmetric  Comments: Patellar deep tendon reflexes normal          The history was obtained from the review of the chart and from the patient  Lab Results:     Blood work done on 11/07/2022 showed a CBC that was normal   CMP showed a glucose of 93 fasting but was otherwise normal   Total cholesterol 190, triglyceride 96, HDL 45,   HADLEY was negative  Rheumatoid factor is less than 7   C-reactive protein is 8  Sed rate is 17  Blood work done on 10/28/2022 showed a TSH of 2 16 with a free T4 of 1 01 and a total T3 of 127    Thyroid peroxidase antibodies are 282 and thyroglobulin antibodies are 3  Blood work done on 06/07/2022 showed a TSH of 3 02     Blood work done on 09/13/2021 showed a TSH of 1 95 with a free T4 of 1 08  Blood work done on 07/09/2021 showed a thyroid peroxidase antibodies of 267 with a TSH of 5 67  Blood work done on 03/22/2019 showed a normal CBC  Thyroglobulin antibodies are 2 1  Twenty-five hydroxy vitamin-D level is 16 4  Vitamin B12 is 576  Ferritin level is 27  And thyroid peroxidase antibodies were 440  No future appointments

## 2022-11-17 NOTE — PATIENT INSTRUCTIONS
Get me a photo of the bottle of supplements  We'll do fasting blood work  We can do 24 hours urine test      Follow up to be determined

## 2023-04-06 LAB
25(OH)D3+25(OH)D2 SERPL-MCNC: 18.6 NG/ML (ref 30–100)
ALBUMIN SERPL-MCNC: 4.2 G/DL (ref 3.8–4.8)
ALBUMIN/GLOB SERPL: 1.8 {RATIO} (ref 1.2–2.2)
ALP SERPL-CCNC: 89 IU/L (ref 44–121)
ALT SERPL-CCNC: 24 IU/L (ref 0–32)
AST SERPL-CCNC: 16 IU/L (ref 0–40)
BILIRUB SERPL-MCNC: <0.2 MG/DL (ref 0–1.2)
BUN SERPL-MCNC: 14 MG/DL (ref 6–20)
BUN/CREAT SERPL: 20 (ref 9–23)
CALCIUM SERPL-MCNC: 9.2 MG/DL (ref 8.7–10.2)
CHLORIDE SERPL-SCNC: 101 MMOL/L (ref 96–106)
CO2 SERPL-SCNC: 26 MMOL/L (ref 20–29)
CREAT SERPL-MCNC: 0.7 MG/DL (ref 0.57–1)
EGFR: 113 ML/MIN/1.73
GLOBULIN SER-MCNC: 2.4 G/DL (ref 1.5–4.5)
GLUCOSE SERPL-MCNC: 89 MG/DL (ref 70–99)
IGF-I SERPL-MCNC: 156 NG/ML (ref 79–259)
INSULIN SERPL-ACNC: 25 UIU/ML (ref 2.6–24.9)
POTASSIUM SERPL-SCNC: 4.5 MMOL/L (ref 3.5–5.2)
PROLACTIN SERPL-MCNC: 8.7 NG/ML (ref 4.8–23.3)
PROT SERPL-MCNC: 6.6 G/DL (ref 6–8.5)
SODIUM SERPL-SCNC: 138 MMOL/L (ref 134–144)
TESTOST FREE SERPL-MCNC: 1.4 PG/ML (ref 0–4.2)
TESTOST SERPL-MCNC: 17 NG/DL (ref 8–60)

## 2024-05-30 ENCOUNTER — HOSPITAL ENCOUNTER (OUTPATIENT)
Dept: ULTRASOUND IMAGING | Facility: CLINIC | Age: 39
Discharge: HOME | End: 2024-05-30
Payer: MEDICARE

## 2024-05-30 DIAGNOSIS — E06.9 THYROIDITIS, UNSPECIFIED: ICD-10-CM

## 2024-05-30 DIAGNOSIS — E04.1 NONTOXIC UNINODULAR GOITER: ICD-10-CM

## 2024-05-30 PROCEDURE — 76536 US EXAM OF HEAD AND NECK: CPT

## 2024-11-25 ENCOUNTER — NURSE TRIAGE (OUTPATIENT)
Age: 39
End: 2024-11-25

## 2024-11-25 NOTE — TELEPHONE ENCOUNTER
"Reason for Conversation: Pt calling to schedule a new pt appt with Cardiology through self referral for b/l leg swelling and sob with exertion that has been happening for months but is recently getting worse. She states the swelling started in her ankles but recently progressed up into her knees. She stated the swelling comes and goes and is worse at night. Also complaining of sob with exertion. States it does not interfere with her normal activities but she does take it easy when she feels it. Denies chest pain napoleon wheezing. Pt unsure of BP.    New pt appt made for pt for tomorrow, 11/26. Advised pt to report to the ED with any worsening s/s. She voiced understanding.     VS/Weight: (Note: Please include date/time vitals/weight were measured)  pt unsure    Pain: No    Risk Factors: Other unknown, denies any cardiac hx. Does have hashimoto's     Recent relevant testing and date of testing: Pt was in ED in July and had LEV done which was negative for DVT    Upcoming Office Visit: Yes, scheduled for tomorrow, 11/26    Last Office Visit: N/A      Reason for Disposition   MODERATE swelling of both ankles (e.g., swelling extends up to the knees) AND new-onset or getting worse    Answer Assessment - Initial Assessment Questions  1. ONSET: \"When did the swelling start?\" (e.g., minutes, hours, days)      B/l feet swelling for months but has recently gotten worse and now has swelling spreading from ankles to knees   2. LOCATION: \"What part of the leg is swollen?\"  \"Are both legs swollen or just one leg?\"      B/l feet now spreading up to knees. States the swelling comes and goes and sometimes can be consistent. States was recently traveling on a cruise. States better in the am, worse at night.   3. SEVERITY: \"How bad is the swelling?\" (e.g., localized; mild, moderate, severe)      Denies pitting edema. Denies swelling elsewhere   4. REDNESS: \"Does the swelling look red or infected?\"      denies  5. PAIN: \"Is the swelling " "painful to touch?\" If Yes, ask: \"How painful is it?\"   (Scale 1-10; mild, moderate or severe)      States the legs get painful at night because she feels her skin was stretching from the swelling   6. FEVER: \"Do you have a fever?\" If Yes, ask: \"What is it, how was it measured, and when did it start?\"       denies  7. CAUSE: \"What do you think is causing the leg swelling?\"      Unsure   8. MEDICAL HISTORY: \"Do you have a history of blood clots (e.g., DVT), cancer, heart failure, kidney disease, or liver failure?\"      Hashimoto's   9. RECURRENT SYMPTOM: \"Have you had leg swelling before?\" If Yes, ask: \"When was the last time?\" \"What happened that time?\"      Denies consistent leg swelling but states in the past has had ankle swelling from over exerting herself   10. OTHER SYMPTOMS: \"Do you have any other symptoms?\" (e.g., chest pain, difficulty breathing)        Sob with exertion. R hand fingertips feel a little numb and aching starting this week. Denies swelling in the hands. Denies chest pain, but states sometimes has a electric shock type feeling in her left shoulder once every 6 months. States does get a little dizzy in her head where she feels a little \"spacey\". Denies vertigo   11. PREGNANCY: \"Is there any chance you are pregnant?\" \"When was your last menstrual period?\"        denies    Answer Assessment - Initial Assessment Questions  1. RESPIRATORY STATUS: \"Describe your breathing?\" (e.g., wheezing, shortness of breath, unable to speak, severe coughing)       Sob with exertion. Denies wheezing   2. ONSET: \"When did this breathing problem begin?\"       Couple months ago and increasing   3. PATTERN \"Does the difficult breathing come and go, or has it been constant since it started?\"       Every time she exerts herself   4. SEVERITY: \"How bad is your breathing?\" (e.g., mild, moderate, severe)       States she can still perform her normal activities with the sob but takes it easy   5. RECURRENT SYMPTOM: \"Have you " "had difficulty breathing before?\" If Yes, ask: \"When was the last time?\" and \"What happened that time?\"       Pt has autoimmune disease and feels its hard to tell apart her s/s.  6. CARDIAC HISTORY: \"Do you have any history of heart disease?\" (e.g., heart attack, angina, bypass surgery, angioplasty)       Denies   7. LUNG HISTORY: \"Do you have any history of lung disease?\"  (e.g., pulmonary embolus, asthma, emphysema)      denies  8. CAUSE: \"What do you think is causing the breathing problem?\"       Unsure   9. OTHER SYMPTOMS: \"Do you have any other symptoms?\" (e.g., chest pain, cough, dizziness, fever, runny nose)      B/l leg swelling  10. O2 SATURATION MONITOR:  \"Do you use an oxygen saturation monitor (pulse oximeter) at home?\" If Yes, ask: \"What is your reading (oxygen level) today?\" \"What is your usual oxygen saturation reading?\" (e.g., 95%)        Unsure   12. TRAVEL: \"Have you traveled out of the country in the last month?\" (e.g., travel history, exposures)        Recent cruise past week.    Protocols used: Leg Swelling and Edema-Adult-[place], Breathing Difficulty-Adult-[place]    "

## 2024-11-26 ENCOUNTER — OFFICE VISIT (OUTPATIENT)
Dept: CARDIOLOGY CLINIC | Facility: CLINIC | Age: 39
End: 2024-11-26
Payer: MEDICARE

## 2024-11-26 VITALS
BODY MASS INDEX: 39.05 KG/M2 | SYSTOLIC BLOOD PRESSURE: 118 MMHG | DIASTOLIC BLOOD PRESSURE: 72 MMHG | WEIGHT: 243 LBS | HEIGHT: 66 IN | HEART RATE: 98 BPM

## 2024-11-26 DIAGNOSIS — R06.09 DYSPNEA ON EXERTION: Primary | ICD-10-CM

## 2024-11-26 DIAGNOSIS — E66.01 SEVERE OBESITY (BMI 35.0-39.9) WITH COMORBIDITY (HCC): ICD-10-CM

## 2024-11-26 DIAGNOSIS — Z82.49 FAMILY HISTORY OF EARLY CAD: ICD-10-CM

## 2024-11-26 PROCEDURE — 93000 ELECTROCARDIOGRAM COMPLETE: CPT | Performed by: INTERNAL MEDICINE

## 2024-11-26 PROCEDURE — 99204 OFFICE O/P NEW MOD 45 MIN: CPT | Performed by: INTERNAL MEDICINE

## 2024-11-26 NOTE — PROGRESS NOTES
Cardiology Office Note  MD Camden Rutledge MD, [facility]  Pio Mcduffie DO, [facility]  MD Kamini Lazaro DO, [facility]  Kirk Hansen DO, [facility]  [place] [address]    Marilia Palma 39 y.o. female MRN: [id number]  Unit/Bed#:  Encounter: [id number]      History of Present Illness:  It was a pleasure to see Marilia Palma in the office today for initial CV evaluation. She has a past medical history of hypothyroid and obesity.  Father had history of CAD in his late 30s.  She establish care with us in November 2024.  The patient had been experiencing episodes of shortness of breath with lower extremity swelling.  The shortness of breath was worse with physical activity and improved with rest.  The lower extremity swelling and dyspnea began in May 2024.  Symptoms progressed over the course of 6 months to the point where she was having severe lower extremity swelling and difficulty with dyspnea with activities of daily living.  To help with her symptoms, she used compression stockings which provided some relief of the lower extremity swelling.  Admits to very mild chest discomfort with peak physical activity.  Admits to occasional palpitations, but no feeling of rapid heart rate.  Denies loss of consciousness.    Review of Systems:  Review of Systems   Constitutional: Negative for decreased appetite, fever, weight gain and weight loss.   HENT:  Negative for congestion and sore throat.    Eyes:  Negative for visual disturbance.   Cardiovascular:  Positive for chest pain, dyspnea on exertion, leg swelling and palpitations. Negative for near-syncope.   Respiratory:  Positive for shortness of breath. Negative for cough.    Hematologic/Lymphatic: Negative for bleeding problem.   Skin:  Negative for rash.   Musculoskeletal:  Negative for myalgias and neck pain.   Gastrointestinal:   Negative for abdominal pain and nausea.   Neurological:  Negative for light-headedness and weakness.   Psychiatric/Behavioral:  Negative for depression.        Past Medical History:   Diagnosis Date    Gastrointestinal food allergy     mushrooms, severe abdominal pain    Hypothyroid        History reviewed. No pertinent surgical history.    Social History     Socioeconomic History    Marital status: /Civil Union     Spouse name: None    Number of children: None    Years of education: None    Highest education level: None   Occupational History    None   Tobacco Use    Smoking status: Never    Smokeless tobacco: Never   Vaping Use    Vaping status: Never Used   Substance and Sexual Activity    Alcohol use: No    Drug use: No    Sexual activity: None   Other Topics Concern    None   Social History Narrative    None     Social Drivers of Health     Financial Resource Strain: Not on file   Food Insecurity: Not on file   Transportation Needs: Not on file   Physical Activity: Not on file   Stress: Not on file   Social Connections: Not on file   Intimate Partner Violence: Not on file   Housing Stability: Not on file       Family History   Problem Relation Age of Onset    Hypothyroidism Mother     Thyroid cancer Mother     Diabetes type II Father     Heart disease Father     Heart attack Father         1st one at age 38    Hypothyroidism Sister     Diabetes type II Sister     Thyroid cancer Sister     No Known Problems Son     No Known Problems Son     No Known Problems Son     No Known Problems Son     No Known Problems Daughter        No Known Allergies      Current Outpatient Medications:     COD LIVER OIL PO, Take 3,000 mg by mouth in the morning, Disp: , Rfl:     levothyroxine 88 mcg tablet, Take 88 mcg by mouth daily, Disp: , Rfl:     MISC NATURAL PRODUCTS PO, Take by mouth Desiccated thyroid-[facility] 130 mg supplement (reportedly T3) daily, Disp: , Rfl:     MISC NATURAL PRODUCTS PO, Take by mouth  "Molecular progesterone complex from [facility] daily day 14-28 of her cycle, Disp: , Rfl:     Nerve Stimulator (TENS Therapy Pain Relief) BERNARDINO, Use 1 Device 3 (three) times a day as needed (Apply up to 15 minutes per session as needed for pain), Disp: 1 each, Rfl: 0    Probiotic Product (PROBIOTIC PO), Take by mouth With vitamin c, Disp: , Rfl:     Vitals:    11/26/24 0837   BP: 118/72   Pulse: 98   Weight: 110 kg (243 lb)   Height: 5' 6\" (1.676 m)     Body mass index is 39.22 kg/m².    PHYSICAL EXAMINATION:  Gen: Awake, Alert, NAD   Head/eyes: AT/NC, pupils equal and round, Anicteric  ENT: mmm  Neck: Supple, No elevated JVP, trachea midline  Resp: CTA bilaterally no w/r/r  CV: RRR +S1, S2, No m/r/g  Abd: Soft, obese, NT/ND + BS  Ext: no LE edema bilaterally  Neuro: Follows commands, moves all extermities  Psych: Appropriate affect, normal mood, pleasant attitude, non-combative  Skin: warm; no rash, erythema or venous stasis changes on exposed skin    --------------------------------------------------------------------------------  TREADMILL STRESS  No results found for this or any previous visit.     --------------------------------------------------------------------------------  NUCLEAR STRESS TEST: No results found for this or any previous visit.    No results found for this or any previous visit.      --------------------------------------------------------------------------------  CATH:  No results found for this or any previous visit.    --------------------------------------------------------------------------------  ECHO:   No results found for this or any previous visit.    No results found for this or any previous visit.    --------------------------------------------------------------------------------  HOLTER  No results found for this or any previous visit.    No results found for this or any previous " "visit.    --------------------------------------------------------------------------------  CAROTIDS  No results found for this or any previous visit.     --------------------------------------------------------------------------------  ECGs:  Results for orders placed or performed in visit on 11/26/24   POCT ECG    Impression    Sinus rhythm 98 bpm, poor R wave progression        No results found for: \"WBC\", \"HGB\", \"HCT\", \"MCV\", \"PLT\"   Lab Results   Component Value Date    SODIUM 138 04/04/2023    K 4.5 04/04/2023     04/04/2023    CO2 26 04/04/2023    BUN 14 04/04/2023    CREATININE 0.70 04/04/2023    GLUC 89 04/04/2023      No results found for: \"HGBA1C\"   No results found for: \"CHOL\"  No results found for: \"HDL\"  No results found for: \"LDLCALC\"  No results found for: \"TRIG\"  No results found for: \"CHOLHDL\"   No results found for: \"INR\", \"PROTIME\"     1. Dyspnea on exertion  -     POCT ECG  -     Stress test only, exercise; Future; Expected date: 11/26/2024  -     Echo complete w/ contrast if indicated; Future; Expected date: 11/26/2024  -     B-Type Natriuretic Peptide(BNP); Future  2. Severe obesity (BMI 35.0-39.9) with comorbidity (HCC)  3. Family history of early CAD  -     CT coronary calcium score; Future; Expected date: 11/26/2024  -     Lipid Panel with Direct LDL reflex; Future; Expected date: 11/26/2024      IMPRESSION:    Dyspnea on exertion  Severe obesity  Family history of early CAD    PLAN:  It was a pleasure to see April in the office today for initial CV evaluation.  She is here today due to her history of shortness of breath as well as a family history of early coronary disease with her father having had MI in his late 30s and passing away early unfortunately.  She has had some shortness of breath with dyspnea on exertion and the peak exercise some chest discomfort.  She did also have some peripheral edema, but clinically examines to be euvolemic in the office today.  Blood pressure and " "heart rate are currently stable.  She is tolerating her current medications without any reported adverse effects.  She has difficulty with significant physical activity but appears to be able to perform greater than 4 METS.  Does admit to some significant exertional dyspnea with this activity though.  Based on her clinical presentation, I have the following recommendations:    1.  Recommend checking exercise stress test to assess for any evidence of underlying myocardial ischemia.  2.  Would obtain 2D echocardiogram to assess cardiac structure and function.  3.  Recommend CT coronary calcium score to assess for any evidence of calcified atherosclerotic disease.  4.  Recommend a heart healthy diet low in sodium and carbohydrate.  Sodium restriction recommended.  5.  Will check BMP and lipid panel for completeness.  6.  Should her symptoms recur, especially worsening in frequency or severity or change in quality, recommend seeking immediate medical attention.  7.  We will follow-up with her after testing to review the results.    As always, please do not hesitate to call with any questions.    Portions of the record may have been created with voice recognition software. Occasional wrong word or \"sound a like\" substitutions may have occurred due to the inherent limitations of voice recognition software. Read the chart carefully and recognize, using context, where substitutions have occurred.      Signed: Pio Mcduffie, , FACC, FASE, FASNC, FACP  "

## 2024-11-28 NOTE — PROGRESS NOTES
Assessment & Plan  Obesity, Class II, BMI 35-39.9  Overall patient is diet plan and exercise routine have been good and consistent throughout the years.  Suspect that her underlying weight gain may be secondary to Hashimoto's thyroiditis.  Recommend the patient follow-up with endocrinology but will order TSH, TPO antibodies.  She also has signs of iron deficiency anemia, will also order CBC and iron panel.  She does have follow-up with cardiology for workup as well.    I do not think that patient would benefit from any GLP agonist or stimulants as she does explain a good diet regimen and exercise routine overall. Further appetite suppression would only lead to nutritional deficiencies.  Will call patient back once labs are resulted  Hashimoto thyroiditis  Referred to endocrinology.  Continue with levothyroxine       Return in about 10 weeks (around 2025) for followup .      Total time spent reviewing chart, reviewing labs,interviewing patient, examining patient, discussing plan, answering all questions, and documentin min. Most recent notes and records were reviewed.      ______________________________________________________________________        Subjective:     Chief Complaint   Patient presents with    Consult     MWM Consult; GW 160lbs; Waist 46in; Stop Bang        HPI: Marilia Palma  is a 39 y.o. female with past medical history of hypothyroidism, class II obesity, Vitamin D deficiency, hasimoto thyroidism presents to clinic because of abnormal weight gain.  Patient states that she has been feeling short of breath and having lower extremity swelling which has slightly improved with compression stockings.  She is also undergoing cardiac workup including echocardiogram, CT coronary calcium score and stress test.  She reports no history of iron deficiency anemia but does have Hashimoto's disease.  She states she noticed waking after her fourth child and was diagnosed with Hashimoto's  "hypothyroidism by primary care doctor.  She has never seen an endocrinologist.      Prior anti-obesity medication: None  Prior weight loss attempts and onset of weight gain:     Current weight: 247    Food Recall:  Daily Diet: Breakfast: eggs over easy, bananas, apples   lunch: Meat steak ground beef, vegs,   Dinner: Lean meats and vegetables    Skip Meals: Rarely  Dining out/takeout: rarely   Hydration:around 100 ounces     Exercise:piliates, mobility strenghting. Walk 5-6 times a week.   Sleep:8 hours     No history of chronic/recurrent pancreatitis, thyroid cancer, MEN-2 tumors. Denies any hx of glaucoma, seizures, kidney stones, gallstones, CAD, PAD, palpitations, arrhythmia. Denies uncontrolled anxiety or depression, suicidal ideation or behavior, insomnia or sleep disturbance.     Review Of Systems:  Constitutional:  Negative for diaphoresis.   Gastrointestinal:  Negative for abdominal pain, constipation, nausea and vomiting.   Skin:  Negative for pallor.   Psychiatric/Behavioral:  Negative for agitation, behavioral problems, confusion, dysphoric mood and hallucinations.    All other systems reviewed and are negative.     Objective:  /80 (BP Location: Left arm, Patient Position: Sitting)   Pulse 100   Temp 97.6 °F (36.4 °C) (Tympanic)   Resp 16   Ht 5' 6\" (1.676 m)   Wt 112 kg (247 lb 3.2 oz)   BMI 39.90 kg/m²     Wt Readings from Last 10 Encounters:   11/29/24 112 kg (247 lb 3.2 oz)   11/26/24 110 kg (243 lb)   11/17/22 106 kg (232 lb 9.6 oz)   09/28/22 103 kg (226 lb)   09/04/19 85.3 kg (188 lb)   11/13/18 92.5 kg (204 lb)       Physical Exam  Constitutional:       General: No acute distress.   HENT:      Head: Normocephalic and atraumatic.   Eyes:      Extraocular Movements: Extraocular movements intact.      Conjunctiva/sclera: Conjunctivae normal.      Pupils: Pupils are equal, round, and reactive to light.   Cardiovascular:      Rate and Rhythm: Normal rate.   Pulmonary:      Effort: " "Pulmonary effort is normal.   Neurological:      General: No focal deficit present.      Mental Status: Alert and oriented to person, place, and time. Mental status is at baseline.   Psychiatric:         Mood and Affect: Mood normal.         Behavior: Behavior normal.     Labs and Imaging  Recent labs and imaging have been personally reviewed.  No results found for: \"WBC\", \"HGB\", \"HCT\", \"MCV\", \"PLT\"    Lab Results   Component Value Date    SODIUM 138 04/04/2023    K 4.5 04/04/2023     04/04/2023    CO2 26 04/04/2023    BUN 14 04/04/2023    CREATININE 0.70 04/04/2023    GLUC 89 04/04/2023    AST 16 04/04/2023    ALT 24 04/04/2023    TP 6.6 04/04/2023    TBILI <0.2 04/04/2023    EGFR 113 04/04/2023     No results found for: \"HGBA1C\"  No results found for: \"[id number]\", \"TSH\"  No results found for: \"CHOLESTEROL\"  No results found for: \"HDL\"  No results found for: \"TRIG\"  No results found for: \"LDLCALC\"         "

## 2024-11-29 ENCOUNTER — OFFICE VISIT (OUTPATIENT)
Dept: BARIATRICS | Facility: CLINIC | Age: 39
End: 2024-11-29
Payer: MEDICARE

## 2024-11-29 VITALS
SYSTOLIC BLOOD PRESSURE: 130 MMHG | RESPIRATION RATE: 16 BRPM | HEIGHT: 66 IN | TEMPERATURE: 97.6 F | HEART RATE: 100 BPM | BODY MASS INDEX: 39.73 KG/M2 | WEIGHT: 247.2 LBS | DIASTOLIC BLOOD PRESSURE: 80 MMHG

## 2024-11-29 DIAGNOSIS — E06.3 HASHIMOTO THYROIDITIS: ICD-10-CM

## 2024-11-29 DIAGNOSIS — R63.5 ABNORMAL WEIGHT GAIN: ICD-10-CM

## 2024-11-29 DIAGNOSIS — E66.812 OBESITY, CLASS II, BMI 35-39.9: ICD-10-CM

## 2024-11-29 PROCEDURE — 99244 OFF/OP CNSLTJ NEW/EST MOD 40: CPT | Performed by: STUDENT IN AN ORGANIZED HEALTH CARE EDUCATION/TRAINING PROGRAM

## 2024-12-02 ENCOUNTER — OFFICE VISIT (OUTPATIENT)
Dept: ENDOCRINOLOGY | Facility: HOSPITAL | Age: 39
End: 2024-12-02
Payer: MEDICARE

## 2024-12-02 VITALS
HEART RATE: 114 BPM | WEIGHT: 244.8 LBS | OXYGEN SATURATION: 97 % | BODY MASS INDEX: 39.34 KG/M2 | DIASTOLIC BLOOD PRESSURE: 82 MMHG | SYSTOLIC BLOOD PRESSURE: 128 MMHG | HEIGHT: 66 IN

## 2024-12-02 DIAGNOSIS — E06.3 HYPOTHYROIDISM DUE TO HASHIMOTO'S THYROIDITIS: Primary | ICD-10-CM

## 2024-12-02 DIAGNOSIS — E88.819 INSULIN RESISTANCE: ICD-10-CM

## 2024-12-02 PROCEDURE — 99214 OFFICE O/P EST MOD 30 MIN: CPT | Performed by: PHYSICIAN ASSISTANT

## 2024-12-02 NOTE — PROGRESS NOTES
Marilia Palma 39 y.o. female MRN: [id number]    Encounter: [id number]      Assessment & Plan     Assessment:  This is a 39 y.o.-year-old female with hypothyroidism due to Hashimoto's thyroiditis.    Plan:  1.  Hypothyroidism due to Hashimoto's thyroiditis: No thyroid lab work completed prior to today's office visit.  Her plan is to transfer care of her hypothyroidism from her PCP to this office.  At this time I want her to repeat thyroid lab work and we will see what kind of adjustments we can make to her medication in the future.  Did express with her that her weight may not just be associated with her thyroid, but we will see what we can do for her in this office.  She will follow-up in 3 months until we can get things under control.    2.  Insulin resistance: Has had previous lab work showing some insulin resistance.  Will recheck insulin levels at this time along with fasting glucose and an A1c to determine if there is any other concerns.    CC: Hypothyroidism follow-up    History of Present Illness     HPI:  Marilia Palma is a 39 year old female with hypothyroidism due to Hashimoto's thyroiditis presenting for follow-up.  Initially seen in our office November 17, 2022 for evaluation of hypothyroidism and weight gain.  Workup for her weight gain from an endocrine standpoint came back negative, and has not followed up since.     She reports that she had struggles with weight and symptoms of hypothyroidism since 2010.  She was 140 lb at that time and is now 247 lb.  She was exercising and running a lot and still was gaining weight and she had seen a primary care physician who brushed off her symptoms and checked a TSH which was always okay but symptoms seem to worsen.  She had 5 pregnancies with her last pregnancy, she was unable to produce breast milk at the age of 30 when she was able to nurse all other children.  She did have lots of bleeding and hemorrhaging after that delivery but she did not need  any blood transfusions and this was 7 years ago.  She has not tried to get pregnant since and had no other complications with prior pregnancies.  She reports that she was diagnosed with hypothyroidism due to Hashimoto's thyroiditis in 2010.  Thyroid hormone was started and the dose was adjusted over the years since she is now on levothyroxine 88 mcg daily which was increased in 2021.  Has been prescribed T3 supplement by holistic doctor in the past, but states she has been inconsistent with this medication.  She does not have any family history of thyroid disease or autoimmune disease.       She has heat intolerance and can feel warm all the time.  She denies palpitation or tremors.  Continues to have fatigue without any significant changes over the last 2 years.  She has no diarrhea or constipation, anxiety or depression.  She has brittle nails and generalized hair loss but not the male pattern.  She has no dry skin.  She has gained her weight over the last 12 years slowly about 90 lb despite exercising and eating clean.  Gained 14 pounds since she was last seen in our office 2 years ago.  She has not been able to get the weight off after the 4th and 5th pregnancy weight gains.  Menstrual cycles have been irregular her whole life and can occur anywhere from 3-7 weeks.  They usually are quite heavy in the 1st several days but only last 3-4 days.  She is been utilizing progesterone.  Is also concerned about chronic joint pains.  She denies diplopia.  She denies compressive thyroid symptoms or difficulties with swallowing.  She is no history of head or neck irradiation in the past.  Of note, she does have a family history of type 2 diabetes and has no violaceous striae or hirsutism.    Review of Systems   Constitutional:  Positive for fatigue and unexpected weight change. Negative for activity change, appetite change and diaphoresis.   HENT:  Negative for sore throat, trouble swallowing and voice change.    Eyes:   Negative for visual disturbance.   Respiratory:  Negative for chest tightness and shortness of breath.    Cardiovascular:  Negative for chest pain, palpitations and leg swelling.   Gastrointestinal:  Negative for abdominal pain, constipation and diarrhea.   Endocrine: Negative for cold intolerance, heat intolerance, polydipsia, polyphagia and polyuria.   Musculoskeletal:  Positive for arthralgias.   Skin:  Negative for rash.   Neurological:  Negative for dizziness, tremors, light-headedness, numbness and headaches.   Hematological:  Negative for adenopathy.   Psychiatric/Behavioral:  Negative for dysphoric mood and sleep disturbance. The patient is not nervous/anxious.        Historical Information   Past Medical History:   Diagnosis Date    Gastrointestinal food allergy     mushrooms, severe abdominal pain    Hypothyroid      No past surgical history on file.  Social History   Social History     Substance and Sexual Activity   Alcohol Use No    Comment: very rarely     Social History     Substance and Sexual Activity   Drug Use No     Social History     Tobacco Use   Smoking Status Never   Smokeless Tobacco Never     Family History:   Family History   Problem Relation Age of Onset    Hypothyroidism Mother     Thyroid cancer Mother     Diabetes type II Father     Heart disease Father     Heart attack Father         1st one at age 38    Hypothyroidism Sister     Diabetes type II Sister     Thyroid cancer Sister     No Known Problems Son     No Known Problems Son     No Known Problems Son     No Known Problems Son     No Known Problems Daughter        Meds/Allergies   Current Outpatient Medications   Medication Sig Dispense Refill    COD LIVER OIL PO Take 3,000 mg by mouth in the morning      levothyroxine 88 mcg tablet Take 88 mcg by mouth daily      MISC NATURAL PRODUCTS PO Take by mouth Desiccated thyroid-[facility] 130 mg supplement (reportedly T3) daily      MISC NATURAL PRODUCTS PO Take by mouth Molecular  progesterone complex from [facility] daily day 14-28 of her cycle      Nerve Stimulator (TENS Therapy Pain Relief) BERNARDINO Use 1 Device 3 (three) times a day as needed (Apply up to 15 minutes per session as needed for pain) 1 each 0    Probiotic Product (PROBIOTIC PO) Take by mouth With vitamin c       No current facility-administered medications for this visit.     No Known Allergies    Objective   Vitals: There were no vitals taken for this visit.    Physical Exam  Vitals and nursing note reviewed.   Constitutional:       General: She is not in acute distress.     Appearance: Normal appearance. She is not diaphoretic.   HENT:      Head: Normocephalic and atraumatic.   Eyes:      General: No scleral icterus.     Extraocular Movements: Extraocular movements intact.      Conjunctiva/sclera: Conjunctivae normal.      Pupils: Pupils are equal, round, and reactive to light.   Cardiovascular:      Rate and Rhythm: Normal rate and regular rhythm.      Heart sounds: No murmur heard.  Pulmonary:      Effort: Pulmonary effort is normal. No respiratory distress.      Breath sounds: Normal breath sounds. No wheezing.   Musculoskeletal:      Cervical back: Normal range of motion.      Right lower leg: No edema.      Left lower leg: No edema.   Lymphadenopathy:      Cervical: No cervical adenopathy.   Neurological:      Mental Status: She is alert and oriented to person, place, and time. Mental status is at baseline.      Sensory: No sensory deficit.      Gait: Gait normal.   Psychiatric:         Mood and Affect: Mood normal.         Behavior: Behavior normal.         Thought Content: Thought content normal.         The history was obtained from the review of the chart, patient.    Lab Results:        Imaging Studies:   Results for orders placed during the hospital encounter of 05/30/24    US thyroid    Impression  1.  No nodule meets current ACR criteria for requiring biopsy but follow-up ultrasound is recommended in 1  "year.  2.  Heterogeneous hyperemic thyroid gland.    Reference: ACR Thyroid Imaging, Reporting and Data System (TI-RADS): White Paper of the ACR TI-RADS Committee. J AM Swathi Radiol 2017;14:587-595. Additional recommendations based on American Thyroid Association 2015 guidelines.      Workstation performed: [id number]      Results Review Statement: I reviewed radiology reports from this admission including: Ultrasound(s).    Portions of the record may have been created with voice recognition software. Occasional wrong word or \"sound a like\" substitutions may have occurred due to the inherent limitations of voice recognition software. Read the chart carefully and recognize, using context, where substitutions have occurred.    "

## 2024-12-02 NOTE — PATIENT INSTRUCTIONS
Get lab work at this time. We will call with results.    Continue with levothyroxine 88 mcg daily.    Call with any concerns.    Follow up in 3 months with labs.

## 2024-12-10 ENCOUNTER — RESULTS FOLLOW-UP (OUTPATIENT)
Dept: ENDOCRINOLOGY | Facility: HOSPITAL | Age: 39
End: 2024-12-10

## 2024-12-10 DIAGNOSIS — E06.3 HYPOTHYROIDISM DUE TO HASHIMOTO'S THYROIDITIS: Primary | ICD-10-CM

## 2024-12-10 LAB
GLUCOSE P FAST SERPL-MCNC: 86 MG/DL (ref 65–99)
HBA1C MFR BLD: 5.9 % OF TOTAL HGB
INSULIN SERPL-ACNC: 17.9 UIU/ML
T3FREE SERPL-MCNC: 3.2 PG/ML (ref 2.3–4.2)
T4 FREE SERPL-MCNC: 1 NG/DL (ref 0.8–1.8)
TSH SERPL-ACNC: 3.99 MIU/L

## 2024-12-10 RX ORDER — LEVOTHYROXINE SODIUM 88 UG/1
TABLET ORAL
Qty: 96 TABLET | Refills: 1 | Status: SHIPPED | OUTPATIENT
Start: 2024-12-10 | End: 2024-12-12 | Stop reason: SDUPTHER

## 2024-12-12 DIAGNOSIS — E06.3 HYPOTHYROIDISM DUE TO HASHIMOTO'S THYROIDITIS: Primary | ICD-10-CM

## 2024-12-12 RX ORDER — LEVOTHYROXINE SODIUM 100 UG/1
100 TABLET ORAL DAILY
Qty: 90 TABLET | Refills: 1 | Status: SHIPPED | OUTPATIENT
Start: 2024-12-12

## 2024-12-13 ENCOUNTER — APPOINTMENT (OUTPATIENT)
Dept: LAB | Facility: CLINIC | Age: 39
End: 2024-12-13
Payer: MEDICARE

## 2024-12-13 DIAGNOSIS — R06.09 DYSPNEA ON EXERTION: ICD-10-CM

## 2024-12-13 DIAGNOSIS — Z82.49 FAMILY HISTORY OF EARLY CAD: ICD-10-CM

## 2024-12-13 DIAGNOSIS — E66.812 OBESITY, CLASS II, BMI 35-39.9: ICD-10-CM

## 2024-12-13 DIAGNOSIS — E06.3 HASHIMOTO THYROIDITIS: ICD-10-CM

## 2024-12-13 DIAGNOSIS — R63.5 ABNORMAL WEIGHT GAIN: ICD-10-CM

## 2024-12-13 LAB
25(OH)D3 SERPL-MCNC: 20 NG/ML (ref 30–100)
ALBUMIN SERPL BCG-MCNC: 4.1 G/DL (ref 3.5–5)
ALP SERPL-CCNC: 68 U/L (ref 34–104)
ALT SERPL W P-5'-P-CCNC: 36 U/L (ref 7–52)
ANION GAP SERPL CALCULATED.3IONS-SCNC: 7 MMOL/L (ref 4–13)
AST SERPL W P-5'-P-CCNC: 20 U/L (ref 13–39)
BASOPHILS # BLD AUTO: 0.02 THOUSANDS/ÂΜL (ref 0–0.1)
BASOPHILS NFR BLD AUTO: 0 % (ref 0–1)
BILIRUB SERPL-MCNC: 0.27 MG/DL (ref 0.2–1)
BNP SERPL-MCNC: 16 PG/ML (ref 0–100)
BUN SERPL-MCNC: 13 MG/DL (ref 5–25)
CALCIUM SERPL-MCNC: 9.5 MG/DL (ref 8.4–10.2)
CHLORIDE SERPL-SCNC: 102 MMOL/L (ref 96–108)
CHOLEST SERPL-MCNC: 187 MG/DL (ref ?–200)
CO2 SERPL-SCNC: 32 MMOL/L (ref 21–32)
CORTIS AM PEAK SERPL-MCNC: 1.5 UG/DL (ref 6.7–22.6)
CREAT SERPL-MCNC: 0.67 MG/DL (ref 0.6–1.3)
EOSINOPHIL # BLD AUTO: 0.05 THOUSAND/ÂΜL (ref 0–0.61)
EOSINOPHIL NFR BLD AUTO: 0 % (ref 0–6)
ERYTHROCYTE [DISTWIDTH] IN BLOOD BY AUTOMATED COUNT: 13.4 % (ref 11.6–15.1)
FERRITIN SERPL-MCNC: 18 NG/ML (ref 11–307)
GFR SERPL CREATININE-BSD FRML MDRD: 110 ML/MIN/1.73SQ M
GLUCOSE P FAST SERPL-MCNC: 90 MG/DL (ref 65–99)
HCT VFR BLD AUTO: 41.6 % (ref 34.8–46.1)
HDLC SERPL-MCNC: 56 MG/DL
HGB BLD-MCNC: 13 G/DL (ref 11.5–15.4)
IMM GRANULOCYTES # BLD AUTO: 0.06 THOUSAND/UL (ref 0–0.2)
IMM GRANULOCYTES NFR BLD AUTO: 1 % (ref 0–2)
IRON SATN MFR SERPL: 13 % (ref 15–50)
IRON SERPL-MCNC: 45 UG/DL (ref 50–212)
LDLC SERPL CALC-MCNC: 120 MG/DL (ref 0–100)
LYMPHOCYTES # BLD AUTO: 3.01 THOUSANDS/ÂΜL (ref 0.6–4.47)
LYMPHOCYTES NFR BLD AUTO: 24 % (ref 14–44)
MCH RBC QN AUTO: 30.2 PG (ref 26.8–34.3)
MCHC RBC AUTO-ENTMCNC: 31.3 G/DL (ref 31.4–37.4)
MCV RBC AUTO: 97 FL (ref 82–98)
MONOCYTES # BLD AUTO: 0.59 THOUSAND/ÂΜL (ref 0.17–1.22)
MONOCYTES NFR BLD AUTO: 5 % (ref 4–12)
NEUTROPHILS # BLD AUTO: 9.06 THOUSANDS/ÂΜL (ref 1.85–7.62)
NEUTS SEG NFR BLD AUTO: 70 % (ref 43–75)
NRBC BLD AUTO-RTO: 0 /100 WBCS
PLATELET # BLD AUTO: 426 THOUSANDS/UL (ref 149–390)
PMV BLD AUTO: 9.7 FL (ref 8.9–12.7)
POTASSIUM SERPL-SCNC: 4.1 MMOL/L (ref 3.5–5.3)
PROT SERPL-MCNC: 7.3 G/DL (ref 6.4–8.4)
RBC # BLD AUTO: 4.31 MILLION/UL (ref 3.81–5.12)
SODIUM SERPL-SCNC: 141 MMOL/L (ref 135–147)
T4 FREE SERPL-MCNC: 0.94 NG/DL (ref 0.61–1.12)
TIBC SERPL-MCNC: 353 UG/DL (ref 250–450)
TRIGL SERPL-MCNC: 54 MG/DL (ref ?–150)
TSH SERPL DL<=0.05 MIU/L-ACNC: 5.67 UIU/ML (ref 0.45–4.5)
UIBC SERPL-MCNC: 308 UG/DL (ref 155–355)
WBC # BLD AUTO: 12.79 THOUSAND/UL (ref 4.31–10.16)

## 2024-12-13 PROCEDURE — 82533 TOTAL CORTISOL: CPT

## 2024-12-13 PROCEDURE — 80061 LIPID PANEL: CPT

## 2024-12-13 PROCEDURE — 85025 COMPLETE CBC W/AUTO DIFF WBC: CPT

## 2024-12-13 PROCEDURE — 82728 ASSAY OF FERRITIN: CPT

## 2024-12-13 PROCEDURE — 80053 COMPREHEN METABOLIC PANEL: CPT

## 2024-12-13 PROCEDURE — 84443 ASSAY THYROID STIM HORMONE: CPT

## 2024-12-13 PROCEDURE — 86376 MICROSOMAL ANTIBODY EACH: CPT

## 2024-12-13 PROCEDURE — 83880 ASSAY OF NATRIURETIC PEPTIDE: CPT

## 2024-12-13 PROCEDURE — 82306 VITAMIN D 25 HYDROXY: CPT

## 2024-12-13 PROCEDURE — 83540 ASSAY OF IRON: CPT

## 2024-12-13 PROCEDURE — 84439 ASSAY OF FREE THYROXINE: CPT

## 2024-12-13 PROCEDURE — 36415 COLL VENOUS BLD VENIPUNCTURE: CPT

## 2024-12-13 PROCEDURE — 83550 IRON BINDING TEST: CPT

## 2024-12-16 ENCOUNTER — HOSPITAL ENCOUNTER (OUTPATIENT)
Dept: NON INVASIVE DIAGNOSTICS | Age: 39
Discharge: HOME | End: 2024-12-16
Payer: MEDICARE

## 2024-12-16 VITALS
HEIGHT: 66 IN | WEIGHT: 244 LBS | HEART RATE: 80 BPM | SYSTOLIC BLOOD PRESSURE: 138 MMHG | DIASTOLIC BLOOD PRESSURE: 94 MMHG | BODY MASS INDEX: 39.21 KG/M2

## 2024-12-16 DIAGNOSIS — R06.09 DYSPNEA ON EXERTION: ICD-10-CM

## 2024-12-16 LAB
AORTIC ROOT: 2.8 CM
APICAL FOUR CHAMBER EJECTION FRACTION: 63 %
ASCENDING AORTA: 2.9 CM
BSA FOR ECHO PROCEDURE: 2.18 M2
CHEST PAIN STATEMENT: NORMAL
DOP CALC LVOT AREA: 3.46 CM2
DOP CALC LVOT DIAMETER: 2.1 CM
E WAVE DECELERATION TIME: 213 MS
E/A RATIO: 1.43
FRACTIONAL SHORTENING: 34 (ref 28–44)
INTERVENTRICULAR SEPTUM IN DIASTOLE (PARASTERNAL SHORT AXIS VIEW): 0.8 CM
INTERVENTRICULAR SEPTUM: 0.8 CM (ref 0.6–1.1)
LAAS-AP2: 13.4 CM2
LAAS-AP4: 16.4 CM2
LEFT ATRIUM SIZE: 3.2 CM
LEFT ATRIUM VOLUME (MOD BIPLANE): 40 ML
LEFT ATRIUM VOLUME INDEX (MOD BIPLANE): 18.3 ML/M2
LEFT INTERNAL DIMENSION IN SYSTOLE: 3.1 CM (ref 2.1–4)
LEFT VENTRICULAR INTERNAL DIMENSION IN DIASTOLE: 4.7 CM (ref 3.5–6)
LEFT VENTRICULAR POSTERIOR WALL IN END DIASTOLE: 1.1 CM
LEFT VENTRICULAR STROKE VOLUME: 66 ML
LVSV (TEICH): 66 ML
MAX DIASTOLIC BP: 84 MMHG
MAX HR PERCENT: 99 %
MAX HR: 179 BPM
MAX PREDICTED HEART RATE: 180 BPM
MV E'TISSUE VEL-SEP: 9 CM/S
MV PEAK A VEL: 0.42 M/S
MV PEAK E VEL: 60 CM/S
MV STENOSIS PRESSURE HALF TIME: 62 MS
MV VALVE AREA P 1/2 METHOD: 3.55
PROTOCOL NAME: NORMAL
RATE PRESSURE PRODUCT: NORMAL
REASON FOR TERMINATION: NORMAL
RIGHT ATRIUM AREA SYSTOLE A4C: 15 CM2
RIGHT VENTRICLE ID DIMENSION: 3.2 CM
SL CV LEFT ATRIUM LENGTH A2C: 4.2 CM
SL CV LV EF: 65
SL CV PED ECHO LEFT VENTRICLE DIASTOLIC VOLUME (MOD BIPLANE) 2D: 105 ML
SL CV PED ECHO LEFT VENTRICLE SYSTOLIC VOLUME (MOD BIPLANE) 2D: 39 ML
SL CV STRESS STAGE REACHED: 3
STRESS ANGINA INDEX: 0
STRESS BASELINE HR: 93 BPM
STRESS PEAK HR: 179 BPM
STRESS POST ESTIMATED WORKLOAD: 9.6 METS
STRESS POST EXERCISE DUR MIN: 6 MIN
STRESS POST EXERCISE DUR MIN: 6 MIN
STRESS POST EXERCISE DUR SEC: 51 SEC
STRESS POST EXERCISE DUR SEC: 51 SEC
STRESS POST PEAK BP: 160 MMHG
STRESS POST PEAK HR: 179 BPM
STRESS POST PEAK SYSTOLIC BP: 160 MMHG
TARGET HR FORMULA: NORMAL
TEST INDICATION: NORMAL
THYROPEROXIDASE AB SERPL-ACNC: 476 IU/ML (ref 0–34)
TRICUSPID ANNULAR PLANE SYSTOLIC EXCURSION: 2.8 CM

## 2024-12-16 PROCEDURE — 93018 CV STRESS TEST I&R ONLY: CPT | Performed by: INTERNAL MEDICINE

## 2024-12-16 PROCEDURE — 93306 TTE W/DOPPLER COMPLETE: CPT | Performed by: INTERNAL MEDICINE

## 2024-12-16 PROCEDURE — 93306 TTE W/DOPPLER COMPLETE: CPT

## 2024-12-16 PROCEDURE — 93017 CV STRESS TEST TRACING ONLY: CPT

## 2024-12-16 PROCEDURE — 93016 CV STRESS TEST SUPVJ ONLY: CPT | Performed by: INTERNAL MEDICINE

## 2024-12-17 ENCOUNTER — HOSPITAL ENCOUNTER (OUTPATIENT)
Dept: CT IMAGING | Facility: HOSPITAL | Age: 39
Discharge: HOME | End: 2024-12-17
Payer: COMMERCIAL

## 2024-12-17 DIAGNOSIS — Z82.49 FAMILY HISTORY OF EARLY CAD: ICD-10-CM

## 2024-12-17 PROCEDURE — 75571 CT HRT W/O DYE W/CA TEST: CPT

## 2024-12-20 NOTE — PROGRESS NOTES
Spoke to patient about lab results, order TPO last visit as her symptoms are presenting as hypothyroidism which were positive.  She recently had increase in Synthroid by endocrinology which should help with weight loss as her weight gain was likely secondary to her hypothyroidism.  She has follow-up in February

## 2025-01-03 ENCOUNTER — DOCUMENTATION (OUTPATIENT)
Dept: ENDOCRINOLOGY | Facility: HOSPITAL | Age: 40
End: 2025-01-03

## 2025-01-06 ENCOUNTER — TELEPHONE (OUTPATIENT)
Dept: CARDIOLOGY CLINIC | Facility: CLINIC | Age: 40
End: 2025-01-06

## 2025-01-06 NOTE — TELEPHONE ENCOUNTER
----- Message from Pio Mcduffie DO sent at 1/6/2025 12:50 PM EST -----  Exercise stress test is negative for evidence of severe blockages in the heart arteries and echocardiogram showed normal function of the heart.  We will discuss the results further in the office.  Please reach out to the patient and let her know this good news.  Thank you.

## 2025-01-14 ENCOUNTER — APPOINTMENT (OUTPATIENT)
Dept: LAB | Facility: CLINIC | Age: 40
End: 2025-01-14
Payer: MEDICARE

## 2025-01-14 DIAGNOSIS — E06.3 HYPOTHYROIDISM DUE TO HASHIMOTO'S THYROIDITIS: ICD-10-CM

## 2025-01-14 DIAGNOSIS — E88.819 INSULIN RESISTANCE: ICD-10-CM

## 2025-01-14 LAB
EST. AVERAGE GLUCOSE BLD GHB EST-MCNC: 123 MG/DL
GLUCOSE P FAST SERPL-MCNC: 96 MG/DL (ref 65–99)
HBA1C MFR BLD: 5.9 %
INSULIN SERPL-ACNC: 20.31 UIU/ML (ref 1.9–23)
T3FREE SERPL-MCNC: 3.32 PG/ML (ref 2.5–3.9)
T4 FREE SERPL-MCNC: 0.74 NG/DL (ref 0.61–1.12)
TSH SERPL DL<=0.05 MIU/L-ACNC: 4.38 UIU/ML (ref 0.45–4.5)

## 2025-01-14 PROCEDURE — 84439 ASSAY OF FREE THYROXINE: CPT

## 2025-01-14 PROCEDURE — 83525 ASSAY OF INSULIN: CPT

## 2025-01-14 PROCEDURE — 84481 FREE ASSAY (FT-3): CPT

## 2025-01-14 PROCEDURE — 83036 HEMOGLOBIN GLYCOSYLATED A1C: CPT

## 2025-01-14 PROCEDURE — 36415 COLL VENOUS BLD VENIPUNCTURE: CPT

## 2025-01-14 PROCEDURE — 82947 ASSAY GLUCOSE BLOOD QUANT: CPT

## 2025-01-14 PROCEDURE — 84443 ASSAY THYROID STIM HORMONE: CPT

## 2025-01-16 ENCOUNTER — RESULTS FOLLOW-UP (OUTPATIENT)
Dept: ENDOCRINOLOGY | Facility: HOSPITAL | Age: 40
End: 2025-01-16

## 2025-01-16 DIAGNOSIS — E06.3 HYPOTHYROIDISM DUE TO HASHIMOTO'S THYROIDITIS: Primary | ICD-10-CM

## 2025-01-16 RX ORDER — LEVOTHYROXINE SODIUM 112 UG/1
112 TABLET ORAL DAILY
Qty: 90 TABLET | Refills: 1 | Status: SHIPPED | OUTPATIENT
Start: 2025-01-16

## 2025-02-09 NOTE — PROGRESS NOTES
Assessment & Plan  Class 2 obesity  Initial weight: 247  (11/29/24)    Current weight: 245    Prescribed  Zepbound 2.5mg.     Recommendation lifestyle modifications by increasing meal frequency, fluid intake as well as a adequate amount of protein. Informed patient to eat at least 3 meals with 2-3 snacks in between and to avoid skipping any meals.,      Patient has been overweight for many years. Patient has tried to maintain healthy dietary changes which include portion control, and has also tried to increase physical activity    Patient has a BMI greater than 39 associated with co-morbidities such as Hashimoto's thyroiditis, prediabetes, Dyslipidemia    Patient recently had Synthroid increased by endocrinology, her TPO levels were elevated.  Hashimoto's thyroiditis may contribute to slower metabolism.  Patient does eat all of her meals and is trying to incorporate more protein into her dietary routine    Encouraged adequate amount of fluids and protein throughout the day to help promote weight loss. Recommend practicing with frequent meals/snacks (3 meals 2-3 snacks daily) as it can potentially improve metabolism and allow for better portion control by decreasing Ghrelin (hunger hormone)     Mindful Eating and Drinking is necessary to promote healthy behaviors that can lead to decreased adipose tissue which can be curative and preventative for comorbidities such as hypertension, diabetes, anxiety, depression, osteoarthritis, dyslipidemia, asthma, liver disease, cardiovascular disease, stroke, sleep apnea and cancers.  Patient's co-morbilities include Hashimoto's hypothyroidism, prediabetes    Fluid intake which is at least half your body weight in ounces is necessary to help control cravings (decreasing confusion for appetite vs water deprivation) as the human body is made up of 50-70% of Fluids. If there is a diversion for water alone, would recommend flavored water (example-splash of lemonade or ice tea) to  help promote compliance. Fluids include Teas, water, flavored water, seltzer water, coffee, shakes     Protein is necessary to promote satiety, metabolism, and muscle growth/repair. Increased energy expenditure is used for the processes of breaking down and rebuilding proteins within the body when eating meals that are protein based      Carbohydrates are essential as it is the body's main fuel source for daily activities.  Recommend that carbohydrates be consumed mostly during the times you are more active during the day      Fats: Essential vitamins like A, D, and E, protect your organs, support cell growth, and contribute to maintaining healthy cholesterol levels      Metabolism:  Metabolism can also be promoted by nutrition, meal frequency and increased muscle mass     Daily Calorie Needs: are individualized and will need to take into account any fluid losses, increased activity levels which may need to be adjusted daily. Recommended to not skip any meals even if there is a lack of appetite as it can be suppressed by caffeine or any prior heavy meal due to Leptin (satiety hormone).  Calorie and fluid intake will need to be increased if there is any increased activity during the day compared to previous days.  With proper fluid and macronutrient intake throughout the day, portion control and decreased cravings will improve     Weight check: BMI and weight serve as only guidelines as it is not factor in muscle mass, fat, water. Weights can fluctuate depending on fluid shifts vs what foods are consumed prior to checking your weight.  Hashimoto thyroiditis        Return in about 5 weeks (around 3/17/2025) for followup .     Most recent notes, labs and previous medical records were reviewed.       ______________________________________________________________________        Subjective:     Chief Complaint   Patient presents with    Follow-up     MWM-10Wk F/u; Waist-45in       HPI:  39 y.o. female with past medical  "history of hypothyroidism, prediabetes,  class II obesity, Vitamin D deficiency, prediabetes, dyslipidemia presents to weight loss management clinic for followup.    Prior anti-obesity medication: None  Food tracking: my fitness pal     Previous diet:   Daily Diet: Breakfast: eggs over easy, bananas, apples   lunch: Meat steak ground beef, vegs,   Dinner: Lean meats and vegetables     Skip Meals: Rarely  Dining out/takeout: rarely   Hydration:around 100 ounces     Dietary Regimen:  Breakfast: Breakfast, eggs ,             Lunch: chicken, veg rice soup   Dinner: chicken, veg, soup   Fluids: 100 ounces water, 8 oz    Physical Activity:  Exercise:piliates, mobility strenghting. 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    All other systems reviewed and are negative.     Objective:  /84   Pulse (!) 109   Temp 98.5 °F (36.9 °C)   Resp 16   Ht 5' 6\" (1.676 m)   Wt 111 kg (245 lb)   BMI 39.54 kg/m²     Wt Readings from Last 30 Encounters:   02/10/25 111 kg (245 lb)   12/16/24 111 kg (244 lb)   12/02/24 111 kg (244 lb 12.8 oz)   11/29/24 112 kg (247 lb 3.2 oz)   11/26/24 110 kg (243 lb)   11/17/22 106 kg (232 lb 9.6 oz)   09/28/22 103 kg (226 lb)   09/04/19 85.3 kg (188 lb)   11/13/18 92.5 kg (204 lb)       Physical Exam  Constitutional:       General: No acute distress.  Well-nourished  HENT:      Head: Normocephalic and atraumatic.   Eyes:      Extraocular Movements: Extraocular movements intact.      Conjunctiva/pupils: Conjunctivae normal. Pupils are equal, round  Pulmonary:      Effort: Pulmonary effort is normal. No labored breathing   Neurological:      General: No focal deficit present.  AO x 3     " Mental Status: Alert and oriented to person, place, and time. Mental status is at baseline.   Psychiatric:         Mood and Affect: Mood normal.         Behavior: Behavior normal.     Labs and Imaging  Recent labs and imaging have been personally reviewed.

## 2025-02-10 ENCOUNTER — OFFICE VISIT (OUTPATIENT)
Dept: BARIATRICS | Facility: CLINIC | Age: 40
End: 2025-02-10
Payer: MEDICARE

## 2025-02-10 VITALS
DIASTOLIC BLOOD PRESSURE: 84 MMHG | HEART RATE: 109 BPM | RESPIRATION RATE: 16 BRPM | BODY MASS INDEX: 39.37 KG/M2 | HEIGHT: 66 IN | SYSTOLIC BLOOD PRESSURE: 120 MMHG | WEIGHT: 245 LBS | TEMPERATURE: 98.5 F

## 2025-02-10 DIAGNOSIS — E06.3 HASHIMOTO THYROIDITIS: ICD-10-CM

## 2025-02-10 DIAGNOSIS — E66.812 CLASS 2 OBESITY DUE TO EXCESS CALORIES WITH BODY MASS INDEX (BMI) OF 38.0 TO 38.9 IN ADULT, UNSPECIFIED WHETHER SERIOUS COMORBIDITY PRESENT: Primary | ICD-10-CM

## 2025-02-10 DIAGNOSIS — E66.09 CLASS 2 OBESITY DUE TO EXCESS CALORIES WITH BODY MASS INDEX (BMI) OF 38.0 TO 38.9 IN ADULT, UNSPECIFIED WHETHER SERIOUS COMORBIDITY PRESENT: Primary | ICD-10-CM

## 2025-02-10 PROCEDURE — 99214 OFFICE O/P EST MOD 30 MIN: CPT | Performed by: STUDENT IN AN ORGANIZED HEALTH CARE EDUCATION/TRAINING PROGRAM

## 2025-02-10 RX ORDER — TIRZEPATIDE 2.5 MG/.5ML
2.5 INJECTION, SOLUTION SUBCUTANEOUS WEEKLY
Qty: 2 ML | Refills: 0 | Status: SHIPPED | OUTPATIENT
Start: 2025-02-10 | End: 2025-02-10

## 2025-02-10 RX ORDER — TIRZEPATIDE 2.5 MG/.5ML
2.5 INJECTION, SOLUTION SUBCUTANEOUS WEEKLY
Qty: 2 ML | Refills: 1 | Status: SHIPPED | OUTPATIENT
Start: 2025-02-10

## 2025-02-12 ENCOUNTER — TELEPHONE (OUTPATIENT)
Dept: CARDIOLOGY CLINIC | Facility: CLINIC | Age: 40
End: 2025-02-12

## 2025-02-13 ENCOUNTER — TELEPHONE (OUTPATIENT)
Dept: BARIATRICS | Facility: CLINIC | Age: 40
End: 2025-02-13

## 2025-02-13 NOTE — TELEPHONE ENCOUNTER
[facility]    via    []CMM-KEY: [id number]  []Surescripts-Case ID #   []Availity-Auth ID # NDC [id number]   []Faxed to plan   []Other website   []Phone call Case ID #     []PA sent as URGENT    All office notes, labs and other pertaining documents and studies sent. Clinical questions answered. Awaiting determination from insurance company.     Turnaround time for your insurance to make a decision on your Prior Authorization can take 7-21 business days.

## 2025-02-14 NOTE — TELEPHONE ENCOUNTER
PA for zepbound 2.5mg APPROVED     Date(s) approved 02/14/2025-08/14/2025    Case #    Patient advised by          []MyChart Message  [x]Phone call   []LMOM  []L/M to call office as no active Communication consent on file  []Unable to leave detailed message as VM not approved on Communication consent       Pharmacy advised by    [x]Fax  []Phone call    Specialty Pharmacy    []     Approval letter scanned into Media Yes

## 2025-02-15 ENCOUNTER — APPOINTMENT (OUTPATIENT)
Dept: LAB | Facility: CLINIC | Age: 40
End: 2025-02-15
Payer: MEDICARE

## 2025-02-15 DIAGNOSIS — E06.3 HYPOTHYROIDISM DUE TO HASHIMOTO'S THYROIDITIS: ICD-10-CM

## 2025-02-15 LAB
T3FREE SERPL-MCNC: 3.61 PG/ML (ref 2.5–3.9)
T4 FREE SERPL-MCNC: 0.79 NG/DL (ref 0.61–1.12)
TSH SERPL DL<=0.05 MIU/L-ACNC: 4.13 UIU/ML (ref 0.45–4.5)

## 2025-02-15 PROCEDURE — 84439 ASSAY OF FREE THYROXINE: CPT

## 2025-02-15 PROCEDURE — 84481 FREE ASSAY (FT-3): CPT

## 2025-02-15 PROCEDURE — 84443 ASSAY THYROID STIM HORMONE: CPT

## 2025-02-15 PROCEDURE — 36415 COLL VENOUS BLD VENIPUNCTURE: CPT

## 2025-03-10 ENCOUNTER — OFFICE VISIT (OUTPATIENT)
Dept: ENDOCRINOLOGY | Facility: HOSPITAL | Age: 40
End: 2025-03-10
Payer: MEDICARE

## 2025-03-10 VITALS
DIASTOLIC BLOOD PRESSURE: 80 MMHG | BODY MASS INDEX: 39.21 KG/M2 | WEIGHT: 244 LBS | HEART RATE: 100 BPM | OXYGEN SATURATION: 98 % | SYSTOLIC BLOOD PRESSURE: 122 MMHG | HEIGHT: 66 IN

## 2025-03-10 DIAGNOSIS — E04.1 LEFT THYROID NODULE: ICD-10-CM

## 2025-03-10 DIAGNOSIS — R59.0 SUBMANDIBULAR LYMPHADENOPATHY: ICD-10-CM

## 2025-03-10 DIAGNOSIS — E06.3 HYPOTHYROIDISM DUE TO HASHIMOTO'S THYROIDITIS: Primary | ICD-10-CM

## 2025-03-10 PROCEDURE — 99214 OFFICE O/P EST MOD 30 MIN: CPT | Performed by: PHYSICIAN ASSISTANT

## 2025-03-10 RX ORDER — LEVOTHYROXINE SODIUM 112 UG/1
TABLET ORAL
Qty: 90 TABLET | Refills: 1 | Status: SHIPPED | OUTPATIENT
Start: 2025-03-10

## 2025-03-10 NOTE — ASSESSMENT & PLAN NOTE
Most recent thyroid lab work came back showing an improvement in thyroid function, but TSH is at the high end of normal and free T4 is at the low end of normal.  At this time I am fine with increasing her levothyroxine 225 mcg daily.  She can in the meantime take the 112 mcg 1 tablet 6 days a week and 1.5 tablet on Sunday.  Repeat lab work in about 6 weeks.  Contact the office with any concerns or questions.  Follow-up in 3 months with labwork completed prior to visit.  Orders:  •  TSH, 3rd generation; Future  •  T4, free; Future  •  TSH, 3rd generation; Future  •  T4, free; Future  •  levothyroxine (Synthroid) 112 mcg tablet; Take 1 tablet 6 days a week and 1.5 tablets on Sunday.

## 2025-03-10 NOTE — PATIENT INSTRUCTIONS
Get lab work at this time. We will call with results.     Continue with levothyroxine 125 mcg daily. Can take levothyroxine 112 mcg 1 tab 6 days a week and 1.5 tabs on Sunday.    Get lab work in about 6 weeks.     Call with any concerns.    Get ultrasound completed prior to next office visit.     Follow up in 3 months with labs.

## 2025-03-10 NOTE — PROGRESS NOTES
Name: Marilia Palma      : 1985      MRN: [id number]  Encounter Provider: Marc Craig PA-C  Encounter Date: 3/10/2025   Encounter department: [facility]    No chief complaint on file.  :  Assessment & Plan  Hypothyroidism due to Hashimoto's thyroiditis  Most recent thyroid lab work came back showing an improvement in thyroid function, but TSH is at the high end of normal and free T4 is at the low end of normal.  At this time I am fine with increasing her levothyroxine 225 mcg daily.  She can in the meantime take the 112 mcg 1 tablet 6 days a week and 1.5 tablet on .  Repeat lab work in about 6 weeks.  Contact the office with any concerns or questions.  Follow-up in 3 months with labwork completed prior to visit.  Orders:  •  TSH, 3rd generation; Future  •  T4, free; Future  •  TSH, 3rd generation; Future  •  T4, free; Future  •  levothyroxine (Synthroid) 112 mcg tablet; Take 1 tablet 6 days a week and 1.5 tablets on .    Left thyroid nodule  Last thyroid ultrasound was completed May 2024.  He is due for a repeat ultrasound prior to next office visit.  Will make further recommendations once results are in.  Orders:  •  US thyroid; Future    Submandibular lymphadenopathy  Has concerns with a lymph node on the left side.  Nothing feels abnormal on physical exam, but we will see if we can have this examined with the thyroid ultrasound.  Orders:  •  US thyroid; Future        History of Present Illness     Marilia Palma is a 40 y.o. female with hypothyroidism due to Hashimoto's thyroiditis presenting for follow-up.  Initially seen in our office 2022 for evaluation of hypothyroidism and weight gain.  Workup for her weight gain from an endocrine standpoint came back negative, and has not followed up since.     She reports that she had struggles with weight and symptoms of hypothyroidism since .  She was 140 lb at  that time and is now 247 lb.  She was exercising and running a lot and still was gaining weight and she had seen a primary care physician who brushed off her symptoms and checked a TSH which was always okay but symptoms seem to worsen.  She had 5 pregnancies with her last pregnancy, she was unable to produce breast milk at the age of 30 when she was able to nurse all other children.  She did have lots of bleeding and hemorrhaging after that delivery but she did not need any blood transfusions and this was 7 years ago.  She has not tried to get pregnant since and had no other complications with prior pregnancies.  She reports that she was diagnosed with hypothyroidism due to Hashimoto's thyroiditis in 2010.  Thyroid hormone was started and the dose was adjusted over the years since she is now on levothyroxine 88 mcg daily which was increased in 2021.  Has been prescribed T3 supplement by holistic doctor in the past, but states she has been inconsistent with this medication.  She does not have any family history of thyroid disease or autoimmune disease.       She has heat intolerance and can feel warm all the time.  She denies palpitation or tremors.  Continues to have fatigue without any significant changes over the last 2 years.  She has no diarrhea or constipation, anxiety or depression.  She has brittle nails and generalized hair loss but not the male pattern.  She has no dry skin.  She has gained her weight over the last 12 years slowly about 90 lb despite exercising and eating clean.  Weight is stable since last office visit.  Has now been following up with weight management and has been giving prescription for Zepbound, but has yet to pick it up as she was waiting for insurance authorization.  She has not been able to get the weight off after the 4th and 5th pregnancy weight gains.  Menstrual cycles have been irregular her whole life and can occur anywhere from 3-7 weeks.  They usually are quite heavy in the  "1st several days but only last 3-4 days.  She is been utilizing progesterone.  Is also concerned about chronic joint pains.  She denies diplopia.  She denies compressive thyroid symptoms or difficulties with swallowing.  She is no history of head or neck irradiation in the past.  Does have a family history of thyroid cancer.  Of note, she does have a family history of type 2 diabetes and has no violaceous striae or hirsutism.      Review of Systems   Constitutional:  Positive for fatigue and unexpected weight change. Negative for activity change, appetite change and diaphoresis.   HENT:  Negative for sore throat, trouble swallowing and voice change.    Eyes:  Negative for visual disturbance.   Respiratory:  Negative for chest tightness and shortness of breath.    Cardiovascular:  Negative for chest pain, palpitations and leg swelling.   Gastrointestinal:  Negative for abdominal pain, constipation and diarrhea.   Endocrine: Negative for cold intolerance, heat intolerance, polydipsia, polyphagia and polyuria.   Musculoskeletal:  Positive for arthralgias.   Skin:  Negative for rash.   Neurological:  Negative for dizziness, tremors, light-headedness, numbness and headaches.   Hematological:  Negative for adenopathy.   Psychiatric/Behavioral:  Negative for dysphoric mood and sleep disturbance. The patient is not nervous/anxious.     as per HPI       Medical History Reviewed by provider this encounter:     .    Objective   /80   Pulse 100   Ht 5' 6\" (1.676 m)   Wt 111 kg (244 lb)   SpO2 98%   BMI 39.38 kg/m²      Body mass index is 39.38 kg/m².  Wt Readings from Last 3 Encounters:   03/10/25 111 kg (244 lb)   02/10/25 111 kg (245 lb)   12/16/24 111 kg (244 lb)     Physical Exam  Vitals and nursing note reviewed.   Constitutional:       General: She is not in acute distress.     Appearance: Normal appearance. She is well-developed. She is not diaphoretic.   Eyes:      General: No scleral icterus.     Extraocular " Movements: Extraocular movements intact.      Conjunctiva/sclera: Conjunctivae normal.      Pupils: Pupils are equal, round, and reactive to light.   Neck:      Thyroid: No thyroid mass, thyromegaly or thyroid tenderness.     Cardiovascular:      Rate and Rhythm: Normal rate and regular rhythm.      Pulses: Normal pulses.      Heart sounds: Normal heart sounds. No murmur heard.     No friction rub. No gallop.   Pulmonary:      Effort: Pulmonary effort is normal. No tachypnea, bradypnea or respiratory distress.      Breath sounds: Normal breath sounds. No wheezing.   Musculoskeletal:      Cervical back: Normal range of motion.      Right lower leg: No edema.      Left lower leg: No edema.   Skin:     General: Skin is warm and dry.   Neurological:      Mental Status: She is alert and oriented to person, place, and time. Mental status is at baseline. She is not disoriented.      Motor: No abnormal muscle tone.      Gait: Gait normal.      Deep Tendon Reflexes: Reflexes are normal and symmetric.   Psychiatric:         Mood and Affect: Mood normal.         Behavior: Behavior normal.         Thought Content: Thought content normal.         Labs:   Lab Results   Component Value Date    HGBA1C 5.9 (H) 01/14/2025    HGBA1C 5.9 (H) 12/09/2024     Lab Results   Component Value Date    CREATININE 0.67 12/13/2024    CREATININE 0.70 04/04/2023    BUN 13 12/13/2024    K 4.1 12/13/2024     12/13/2024    CO2 32 12/13/2024     eGFR   Date Value Ref Range Status   12/13/2024 110 ml/min/1.73sq m Final     Lab Results   Component Value Date    HDL 56 12/13/2024    TRIG 54 12/13/2024     Lab Results   Component Value Date    ALT 36 12/13/2024    AST 20 12/13/2024    ALKPHOS 68 12/13/2024     Lab Results   Component Value Date    MLT0GXELHKPU 4.131 02/15/2025    YLU6LNMMJAYJ 4.383 01/14/2025    MVH5KYEIJVUF 5.669 (H) 12/13/2024     Lab Results   Component Value Date    FREET4 0.79 02/15/2025       Patient Instructions   Get lab work  at this time. We will call with results.     Continue with levothyroxine 125 mcg daily. Can take levothyroxine 112 mcg 1 tab 6 days a week and 1.5 tabs on Sunday.    Get lab work in about 6 weeks.     Call with any concerns.    Get ultrasound completed prior to next office visit.     Follow up in 3 months with labs.    Discussed with the patient and all questioned fully answered. She will call me if any problems arise.

## 2025-04-01 ENCOUNTER — OFFICE VISIT (OUTPATIENT)
Dept: BARIATRICS | Facility: CLINIC | Age: 40
End: 2025-04-01
Payer: MEDICARE

## 2025-04-01 VITALS
RESPIRATION RATE: 17 BRPM | BODY MASS INDEX: 38.31 KG/M2 | HEART RATE: 74 BPM | HEIGHT: 66 IN | DIASTOLIC BLOOD PRESSURE: 78 MMHG | WEIGHT: 238.4 LBS | TEMPERATURE: 98.7 F | SYSTOLIC BLOOD PRESSURE: 112 MMHG

## 2025-04-01 DIAGNOSIS — E66.09 CLASS 2 OBESITY DUE TO EXCESS CALORIES WITH BODY MASS INDEX (BMI) OF 38.0 TO 38.9 IN ADULT, UNSPECIFIED WHETHER SERIOUS COMORBIDITY PRESENT: Primary | ICD-10-CM

## 2025-04-01 DIAGNOSIS — E66.812 CLASS 2 OBESITY DUE TO EXCESS CALORIES WITH BODY MASS INDEX (BMI) OF 38.0 TO 38.9 IN ADULT, UNSPECIFIED WHETHER SERIOUS COMORBIDITY PRESENT: Primary | ICD-10-CM

## 2025-04-01 DIAGNOSIS — E06.3 HASHIMOTO THYROIDITIS: ICD-10-CM

## 2025-04-01 PROCEDURE — 99214 OFFICE O/P EST MOD 30 MIN: CPT | Performed by: STUDENT IN AN ORGANIZED HEALTH CARE EDUCATION/TRAINING PROGRAM

## 2025-04-01 RX ORDER — TIRZEPATIDE 5 MG/.5ML
5 INJECTION, SOLUTION SUBCUTANEOUS WEEKLY
Qty: 2 ML | Refills: 3 | Status: SHIPPED | OUTPATIENT
Start: 2025-04-01

## 2025-04-01 NOTE — PROGRESS NOTES
Assessment & Plan  Class 2 obesity    Initial weight: 247 (11/29/24)  Previous weight: 245  Current weight: 238     TBW loss%: 3.7     Medication: Increase Zepbound 2.5 to 5mg     Patient understands the importance of making lifestyle changes as recommended below to aid in weight loss.      Dietary Recommendations:  Recommend to avoid skipping any meals. Adequate amount of Macronutrients (minimal 3 meals a day) is necessary to help improve metabolism, satiety and allow for better portion control by decreasing Ghrelin (hunger hormone). Lack of hunger can be suppressed by a hormone called Leptin (full hormone) which can occur from a previous meal or caffeine intake    Protein intake throughout the day can help promote satiety and is necessary for muscle growth/repair    Carbohydrates are essential as it is the vital source of fuel for daily activities. energy, cell function, nutrient absorption, and hormone production.     Fats: Essential vitamins like A, D, and E, support cell growth, function and are necessary for nutrient absorption to support your organs     Fluid intake which is at least half your body weight in ounces is necessary to help control cravings (decreasing confusion for appetite vs water deprivation) as the human body is made up of 50-70% of Fluids. If there is a diversion for water alone, would recommend flavored water (example-splash of lemonade or ice tea) to help promote compliance. Fluids include Teas, water, flavored water, seltzer water, coffee, shakes    Metabolism:    Metabolism can also be promoted by macronutrient intake and increased muscle weight via thermogenesis      Daily Calorie Needs: Recommend to take into account any fluid losses and calories burned via increased activity levels as daily calories may need to be adjusted     Weight check: Weights can fluctuate depending on fluid shifts vs what foods are consumed prior to checking your weight          Return in about 11 weeks  "(around 6/17/2025) for following .     Most recent notes, labs and previous medical records were reviewed. Total time with chart review and with the patient: 35 min      ______________________________________________________________________        Subjective:     Chief Complaint   Patient presents with    Follow-up     MWM F/u; Waist 44in       HPI: 40 y.o. female with pmh of  hypothyroidism, prediabetes,  class II obesity, Vitamin D deficiency, prediabetes, dyslipidemia  presents for follow-up    Dietary Regimen:  Breakfast: Breakfast, eggs ,             Lunch: chicken, veg rice soup   Dinner: chicken, veg, soup   Fluids: 100 ounces water, 8 oz     Physical Activity:  Exercise:piliates, mobility strenghting. 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    All other systems reviewed and are negative.     Objective:  /78 (BP Location: Left arm, Patient Position: Sitting)   Pulse 74   Temp 98.7 °F (37.1 °C) (Tympanic)   Resp 17   Ht 5' 6\" (1.676 m)   Wt 108 kg (238 lb 6.4 oz)   LMP 03/13/2025   BMI 38.48 kg/m²     Wt Readings from Last 30 Encounters:   04/01/25 108 kg (238 lb 6.4 oz)   03/10/25 111 kg (244 lb)   02/10/25 111 kg (245 lb)   12/16/24 111 kg (244 lb)   12/02/24 111 kg (244 lb 12.8 oz)   11/29/24 112 kg (247 lb 3.2 oz)   11/26/24 110 kg (243 lb)   11/17/22 106 kg (232 lb 9.6 oz)   09/28/22 103 kg (226 lb)   09/04/19 85.3 kg (188 lb)   11/13/18 92.5 kg (204 lb)       Physical Exam  Constitutional:       General: No acute distress.  Well-nourished  HENT:      Head: Normocephalic and atraumatic.   Eyes:      Extraocular Movements: Extraocular movements intact.      Conjunctiva/pupils: Conjunctivae " normal. Pupils are equal, round  Pulmonary:      Effort: Pulmonary effort is normal. No labored breathing   Neurological:      General: No focal deficit present.  AO x 3     Mental Status: Alert and oriented to person, place, and time. Mental status is at baseline.   Psychiatric:         Mood and Affect: Mood normal.         Behavior: Behavior normal.     Labs and Imaging  Recent labs and imaging have been personally reviewed.

## 2025-04-03 ENCOUNTER — TELEPHONE (OUTPATIENT)
Age: 40
End: 2025-04-03

## 2025-04-03 NOTE — TELEPHONE ENCOUNTER
Patient called in to advise that she has a form that needs to be completed for medicaid regarding her medication. Patient is going to upload into her MY[facility] and ask that we complete asap as she did not realize it was due so quickly.

## 2025-05-07 ENCOUNTER — TELEPHONE (OUTPATIENT)
Dept: ENDOCRINOLOGY | Facility: HOSPITAL | Age: 40
End: 2025-05-07

## 2025-05-20 ENCOUNTER — HOSPITAL ENCOUNTER (OUTPATIENT)
Dept: ULTRASOUND IMAGING | Facility: CLINIC | Age: 40
Discharge: HOME | End: 2025-05-20
Payer: MEDICARE

## 2025-05-20 DIAGNOSIS — E04.1 LEFT THYROID NODULE: ICD-10-CM

## 2025-05-20 DIAGNOSIS — R59.0 SUBMANDIBULAR LYMPHADENOPATHY: ICD-10-CM

## 2025-05-20 PROCEDURE — 76536 US EXAM OF HEAD AND NECK: CPT

## 2025-05-28 ENCOUNTER — RESULTS FOLLOW-UP (OUTPATIENT)
Dept: ENDOCRINOLOGY | Facility: HOSPITAL | Age: 40
End: 2025-05-28

## 2025-06-18 ENCOUNTER — OFFICE VISIT (OUTPATIENT)
Dept: BARIATRICS | Facility: CLINIC | Age: 40
End: 2025-06-18
Payer: MEDICARE

## 2025-06-18 VITALS
WEIGHT: 221.8 LBS | BODY MASS INDEX: 35.65 KG/M2 | DIASTOLIC BLOOD PRESSURE: 70 MMHG | HEIGHT: 66 IN | TEMPERATURE: 99.6 F | HEART RATE: 98 BPM | SYSTOLIC BLOOD PRESSURE: 124 MMHG

## 2025-06-18 DIAGNOSIS — E66.812 CLASS 2 OBESITY DUE TO EXCESS CALORIES WITH BODY MASS INDEX (BMI) OF 38.0 TO 38.9 IN ADULT, UNSPECIFIED WHETHER SERIOUS COMORBIDITY PRESENT: Primary | ICD-10-CM

## 2025-06-18 DIAGNOSIS — E66.09 CLASS 2 OBESITY DUE TO EXCESS CALORIES WITH BODY MASS INDEX (BMI) OF 38.0 TO 38.9 IN ADULT, UNSPECIFIED WHETHER SERIOUS COMORBIDITY PRESENT: Primary | ICD-10-CM

## 2025-06-18 DIAGNOSIS — E06.3 HASHIMOTO THYROIDITIS: ICD-10-CM

## 2025-06-18 PROCEDURE — 99214 OFFICE O/P EST MOD 30 MIN: CPT | Performed by: STUDENT IN AN ORGANIZED HEALTH CARE EDUCATION/TRAINING PROGRAM

## 2025-06-18 RX ORDER — TIRZEPATIDE 5 MG/.5ML
5 INJECTION, SOLUTION SUBCUTANEOUS WEEKLY
Qty: 2 ML | Refills: 4 | Status: SHIPPED | OUTPATIENT
Start: 2025-06-18

## 2025-06-18 NOTE — PROGRESS NOTES
Assessment & Plan  Class 2 obesity    Initial weight: 247 (11/29/24)  Previous weight: 238  Current weight: 221    TBW loss%:10.5     Medication: Continue Zepbound  5mg     Patient understands the importance of making lifestyle changes as recommended below to aid in weight loss.      Dietary Recommendations:  Recommend to avoid skipping any meals. Adequate amount of Macronutrients (minimal 3 meals a day) is necessary to help improve metabolism, satiety and allow for better portion control by decreasing Ghrelin (hunger hormone). Lack of hunger can be suppressed by a hormone called Leptin (full hormone) which can occur from a previous meal or caffeine intake    Protein intake throughout the day can help promote satiety and is necessary for muscle growth/repair    Carbohydrates are essential as it is the vital source of fuel for daily activities. energy, cell function, nutrient absorption, and hormone production.     Fats: Essential vitamins like A, D, and E, support cell growth, function and are necessary for nutrient absorption to support your organs     Fluid intake which is at least half your body weight in ounces is necessary to help control cravings (decreasing confusion for appetite vs water deprivation) as the human body is made up of 50-70% of Fluids. If there is a diversion for water alone, would recommend flavored water (example-splash of lemonade or ice tea) to help promote compliance. Fluids include Teas, water, flavored water, seltzer water, coffee, shakes    Metabolism:    Metabolism can also be promoted by macronutrient intake and increased muscle weight via thermogenesis      Daily Calorie Needs: Recommend to take into account any fluid losses and calories burned via increased activity levels as daily calories may need to be adjusted     Weight check: Weights can fluctuate depending on fluid shifts vs what foods are consumed prior to checking your weight          Return in about 4 months (around  "10/7/2025).     Most recent notes, labs and previous medical records were reviewed. Total time with chart review and with the patient: 35 min      ______________________________________________________________________        Subjective:     Chief Complaint   Patient presents with    Follow-up     MWM 11 Wk  F/U , waist  40 inch .       HPI: 40 y.o. female with pmh of  hypothyroidism, prediabetes,  class II obesity, Vitamin D deficiency, prediabetes, dyslipidemia  presents for follow-up    Med: Zepbound 5    Dietary Regimen:  Breakfast: Breakfast, eggs ,             Lunch: chicken, veg rice soup   Dinner: chicken, veg, soup   Fluids: 100 ounces water, 8 oz     Physical Activity:  Exercise:piliates, mobility strenghting. 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    All other systems reviewed and are negative.     Objective:  /70 (Patient Position: Sitting, Cuff Size: Standard)   Pulse 98   Temp 99.6 °F (37.6 °C) (Tympanic)   Ht 5' 6\" (1.676 m)   Wt 101 kg (221 lb 12.8 oz)   BMI 35.80 kg/m²     Wt Readings from Last 30 Encounters:   06/18/25 101 kg (221 lb 12.8 oz)   04/01/25 108 kg (238 lb 6.4 oz)   03/10/25 111 kg (244 lb)   02/10/25 111 kg (245 lb)   12/16/24 111 kg (244 lb)   12/02/24 111 kg (244 lb 12.8 oz)   11/29/24 112 kg (247 lb 3.2 oz)   11/26/24 110 kg (243 lb)   11/17/22 106 kg (232 lb 9.6 oz)   09/28/22 103 kg (226 lb)   09/04/19 85.3 kg (188 lb)   11/13/18 92.5 kg (204 lb)       Physical Exam  Constitutional:       General: No acute distress.  Well-nourished  HENT:      Head: Normocephalic and atraumatic.   Eyes:      Extraocular Movements: Extraocular movements intact.      " Conjunctiva/pupils: Conjunctivae normal. Pupils are equal, round  Pulmonary:      Effort: Pulmonary effort is normal. No labored breathing   Neurological:      General: No focal deficit present.  AO x 3     Mental Status: Alert and oriented to person, place, and time. Mental status is at baseline.   Psychiatric:         Mood and Affect: Mood normal.         Behavior: Behavior normal.     Labs and Imaging  Recent labs and imaging have been personally reviewed.

## 2025-06-19 ENCOUNTER — OFFICE VISIT (OUTPATIENT)
Dept: ENDOCRINOLOGY | Facility: HOSPITAL | Age: 40
End: 2025-06-19
Payer: MEDICARE

## 2025-06-19 VITALS
DIASTOLIC BLOOD PRESSURE: 78 MMHG | BODY MASS INDEX: 35.52 KG/M2 | HEIGHT: 66 IN | HEART RATE: 80 BPM | OXYGEN SATURATION: 98 % | SYSTOLIC BLOOD PRESSURE: 122 MMHG | WEIGHT: 221 LBS

## 2025-06-19 DIAGNOSIS — E06.3 HYPOTHYROIDISM DUE TO HASHIMOTO'S THYROIDITIS: Primary | ICD-10-CM

## 2025-06-19 PROCEDURE — 99213 OFFICE O/P EST LOW 20 MIN: CPT | Performed by: PHYSICIAN ASSISTANT

## 2025-06-19 NOTE — PATIENT INSTRUCTIONS
Get lab work at this time. We will call with results.     Continue with levothyroxine 112 mcg daily. We will get labs to see if adjustments need to be made.    Call with any concerns.     Get ultrasound completed prior to next office visit.     Follow up in 3 months with labs.

## 2025-06-19 NOTE — ASSESSMENT & PLAN NOTE
No thyroid lab work completed prior to today's office visit.  Unfortunately there seems to been a mixup with dose adjustment, and she is only currently taking levothyroxine 112 mcg daily.  Since she is losing weight, I would like to check thyroid lab work before I increase her dose to 125 mcg daily as this may not be necessary at this time.  Once again did discuss that with her weight loss we may need to continue back off her levothyroxine.  Contact the office with any concerns or questions.  Will have her follow-up again in 3 months, and as things start to stabilize, we will likely extend out her office visits.  Orders:  •  TSH, 3rd generation; Future  •  T4, free; Future

## 2025-06-19 NOTE — PROGRESS NOTES
Name: Marilia Palma      : 1985      MRN: [id number]  Encounter Provider: Marc Craig PA-C  Encounter Date: 2025   Encounter department: [facility]    No chief complaint on file.  :  Assessment & Plan  Hypothyroidism due to Hashimoto's thyroiditis  No thyroid lab work completed prior to today's office visit.  Unfortunately there seems to been a mixup with dose adjustment, and she is only currently taking levothyroxine 112 mcg daily.  Since she is losing weight, I would like to check thyroid lab work before I increase her dose to 125 mcg daily as this may not be necessary at this time.  Once again did discuss that with her weight loss we may need to continue back off her levothyroxine.  Contact the office with any concerns or questions.  Will have her follow-up again in 3 months, and as things start to stabilize, we will likely extend out her office visits.  Orders:  •  TSH, 3rd generation; Future  •  T4, free; Future      History of Present Illness     Marilia Palma is a 40 y.o. female with hypothyroidism due to Hashimoto's thyroiditis presenting for follow-up.  Initially seen in our office 2022 for evaluation of hypothyroidism and weight gain.  Workup for her weight gain from an endocrine standpoint came back negative, and has not followed up since.     She reports that she had struggles with weight and symptoms of hypothyroidism since .  She was 140 lb at that time of her diagnosis.  She was exercising and running a lot and still was gaining weight and she had seen a primary care physician who brushed off her symptoms and checked a TSH which was always okay but symptoms seem to worsen.  She had 5 pregnancies with her last pregnancy, she was unable to produce breast milk at the age of 30 when she was able to nurse all other children.  She did have lots of bleeding and hemorrhaging after that delivery but she did  not need any blood transfusions and this was 7 years ago.  She has not tried to get pregnant since and had no other complications with prior pregnancies.  She reports that she was diagnosed with hypothyroidism due to Hashimoto's thyroiditis in 2010.  Thyroid hormone was started and the dose was adjusted over the years since she is now on levothyroxine 112 mcg daily.  There was some confusion with the increase in her dose, but at last office visit it was supposed to be increased to 125 mcg daily.  Has been prescribed T3 supplement by holistic doctor in the past, but states she has been inconsistent with this medication.  She does not have any family history of thyroid disease or autoimmune disease.       She has heat intolerance and can feel warm all the time.  She denies palpitation or tremors.  Continues to have fatigue without any significant changes over the last 2 years.  She has no diarrhea or constipation, anxiety or depression.  She has brittle nails and generalized hair loss but not the male pattern.  She has no dry skin.  She has gained her weight over the last 12 years slowly about 90 lb despite exercising and eating clean.  Weight is stable since last office visit.  Has now been following up with weight management and has been giving prescription for Zepbound 5 mg weekly.  Since last office visit has lost 22 pounds.  Menstrual cycles have been irregular her whole life and can occur anywhere from 3-7 weeks.  They usually are quite heavy in the 1st several days but only last 3-4 days.  She is been utilizing progesterone.  Is also concerned about chronic joint pains.  She denies diplopia.  She denies compressive thyroid symptoms or difficulties with swallowing.  She is no history of head or neck irradiation in the past.  Does have a family history of thyroid cancer.  Of note, she does have a family history of type 2 diabetes and has no violaceous striae or hirsutism.    Review of Systems   Constitutional:   "Positive for fatigue and unexpected weight change. Negative for activity change, appetite change and diaphoresis.   HENT:  Negative for sore throat, trouble swallowing and voice change.    Eyes:  Negative for visual disturbance.   Respiratory:  Negative for chest tightness and shortness of breath.    Cardiovascular:  Negative for chest pain, palpitations and leg swelling.   Gastrointestinal:  Negative for abdominal pain, constipation and diarrhea.   Endocrine: Negative for cold intolerance, heat intolerance, polydipsia, polyphagia and polyuria.   Musculoskeletal:  Positive for arthralgias.   Skin:  Negative for rash.   Neurological:  Negative for dizziness, tremors, light-headedness, numbness and headaches.   Hematological:  Negative for adenopathy.   Psychiatric/Behavioral:  Negative for dysphoric mood and sleep disturbance. The patient is not nervous/anxious.     as per HPI  Medical History Reviewed by provider this encounter:     .  Medications Ordered Prior to Encounter[1]   Social History[2]     Medical History Reviewed by provider this encounter:     .    Objective   /78   Pulse 80   Ht 5' 6\" (1.676 m)   Wt 100 kg (221 lb)   SpO2 98%   BMI 35.67 kg/m²      Body mass index is 35.67 kg/m².  Wt Readings from Last 3 Encounters:   06/19/25 100 kg (221 lb)   06/18/25 101 kg (221 lb 12.8 oz)   04/01/25 108 kg (238 lb 6.4 oz)     Physical Exam  Vitals and nursing note reviewed.   Constitutional:       General: She is not in acute distress.     Appearance: Normal appearance. She is well-developed. She is not diaphoretic.     Eyes:      General: No scleral icterus.     Extraocular Movements: Extraocular movements intact.      Conjunctiva/sclera: Conjunctivae normal.      Pupils: Pupils are equal, round, and reactive to light.       Cardiovascular:      Rate and Rhythm: Normal rate and regular rhythm.      Pulses: Normal pulses.      Heart sounds: Normal heart sounds. No murmur heard.     No friction rub. No " gallop.   Pulmonary:      Effort: Pulmonary effort is normal. No tachypnea, bradypnea or respiratory distress.      Breath sounds: Normal breath sounds. No wheezing.     Musculoskeletal:      Cervical back: Normal range of motion.      Right lower leg: No edema.      Left lower leg: No edema.   Lymphadenopathy:      Cervical: No cervical adenopathy.     Skin:     General: Skin is warm and dry.     Neurological:      Mental Status: She is alert and oriented to person, place, and time. Mental status is at baseline. She is not disoriented.      Motor: No abnormal muscle tone.      Gait: Gait normal.      Deep Tendon Reflexes: Reflexes are normal and symmetric.     Psychiatric:         Mood and Affect: Mood normal.         Behavior: Behavior normal.         Thought Content: Thought content normal.         Labs: I have reviewed pertinent labs including:   Lab Results   Component Value Date    HGBA1C 5.9 (H) 01/14/2025    HGBA1C 5.9 (H) 12/09/2024      Lab Results   Component Value Date    CREATININE 0.67 12/13/2024    CREATININE 0.70 04/04/2023    BUN 13 12/13/2024    K 4.1 12/13/2024     12/13/2024    CO2 32 12/13/2024      eGFR   Date Value Ref Range Status   12/13/2024 110 ml/min/1.73sq m Final      ALT   Date Value Ref Range Status   12/13/2024 36 7 - 52 U/L Final     Comment:     Specimen collection should occur prior to Sulfasalazine administration due to the potential for falsely depressed results.    04/04/2023 24 0 - 32 IU/L Final     AST   Date Value Ref Range Status   12/13/2024 20 13 - 39 U/L Final   04/04/2023 16 0 - 40 IU/L Final     Alkaline Phosphatase   Date Value Ref Range Status   12/13/2024 68 34 - 104 U/L Final      Lab Results   Component Value Date    VPM5NFOVYWSH 4.131 02/15/2025      Lab Results   Component Value Date    FREET4 0.79 02/15/2025    T3FREE 3.61 02/15/2025      Radiology Results Review : No pertinent imaging studies reviewed.  Patient Instructions   Get lab work at this time.  We will call with results.     Continue with levothyroxine 112 mcg daily. We will get labs to see if adjustments need to be made.    Call with any concerns.     Get ultrasound completed prior to next office visit.     Follow up in 3 months with labs.    Discussed with the patient and all questioned fully answered. She will call me if any problems arise.             [1]  Current Outpatient Medications on File Prior to Visit   Medication Sig Dispense Refill   • levothyroxine (Synthroid) 112 mcg tablet Take 1 tablet 6 days a week and 1.5 tablets on Sunday. (Patient taking differently: Take 112 mcg by mouth daily) 90 tablet 1   • MISC NATURAL PRODUCTS PO Take by mouth Molecular progesterone complex from [facility] daily day 14-28 of her cycle     • tirzepatide (Zepbound) 5 mg/0.5 mL auto-injector Inject 0.5 mL (5 mg total) under the skin once a week 2 mL 4   • COD LIVER OIL PO Take 3,000 mg by mouth in the morning     • MISC NATURAL PRODUCTS PO Take by mouth Desiccated thyroid-[facility] 130 mg supplement (reportedly T3) daily (Patient not taking: Reported on 6/18/2025)     • Nerve Stimulator (TENS Therapy Pain Relief) BERNARDINO Use 1 Device 3 (three) times a day as needed (Apply up to 15 minutes per session as needed for pain) 1 each 0   • Probiotic Product (PROBIOTIC PO) Take by mouth With vitamin c (Patient not taking: Reported on 6/19/2025)       No current facility-administered medications on file prior to visit.   [2]  Social History  Tobacco Use   • Smoking status: Never   • Smokeless tobacco: Never   Vaping Use   • Vaping status: Never Used   Substance and Sexual Activity   • Alcohol use: No     Comment: very rarely   • Drug use: No   • Sexual activity: Not Currently     Partners: Male     Birth control/protection: Abstinence

## 2025-07-13 DIAGNOSIS — E06.3 HYPOTHYROIDISM DUE TO HASHIMOTO'S THYROIDITIS: ICD-10-CM

## 2025-07-15 RX ORDER — LEVOTHYROXINE SODIUM 112 UG/1
112 TABLET ORAL DAILY
Qty: 90 TABLET | Refills: 1 | Status: SHIPPED | OUTPATIENT
Start: 2025-07-15

## 2025-08-04 ENCOUNTER — TELEPHONE (OUTPATIENT)
Dept: BARIATRICS | Facility: CLINIC | Age: 40
End: 2025-08-04